# Patient Record
Sex: MALE | Race: BLACK OR AFRICAN AMERICAN | NOT HISPANIC OR LATINO | Employment: UNEMPLOYED | URBAN - METROPOLITAN AREA
[De-identification: names, ages, dates, MRNs, and addresses within clinical notes are randomized per-mention and may not be internally consistent; named-entity substitution may affect disease eponyms.]

---

## 2021-11-30 ENCOUNTER — OFFICE VISIT (OUTPATIENT)
Dept: PSYCHIATRY | Facility: CLINIC | Age: 31
End: 2021-11-30

## 2021-11-30 DIAGNOSIS — F31.60 BIPOLAR AFFECTIVE DISORDER, MIXED (HCC): Chronic | ICD-10-CM

## 2021-11-30 DIAGNOSIS — F41.1 GAD (GENERALIZED ANXIETY DISORDER): Primary | Chronic | ICD-10-CM

## 2021-11-30 DIAGNOSIS — F90.0 ATTENTION DEFICIT HYPERACTIVITY DISORDER (ADHD), PREDOMINANTLY INATTENTIVE TYPE: Chronic | ICD-10-CM

## 2021-11-30 PROCEDURE — 90792 PSYCH DIAG EVAL W/MED SRVCS: CPT | Performed by: PHYSICIAN ASSISTANT

## 2021-11-30 RX ORDER — DEXTROAMPHETAMINE SACCHARATE, AMPHETAMINE ASPARTATE, DEXTROAMPHETAMINE SULFATE AND AMPHETAMINE SULFATE 2.5; 2.5; 2.5; 2.5 MG/1; MG/1; MG/1; MG/1
TABLET ORAL
Qty: 30 TABLET | Refills: 0 | Status: SHIPPED | OUTPATIENT
Start: 2021-11-30 | End: 2021-12-30 | Stop reason: SDUPTHER

## 2021-11-30 RX ORDER — CLONAZEPAM 1 MG/1
1 TABLET ORAL 3 TIMES DAILY PRN
Qty: 90 TABLET | Refills: 2 | Status: SHIPPED | OUTPATIENT
Start: 2021-11-30 | End: 2022-02-22 | Stop reason: SDUPTHER

## 2021-11-30 RX ORDER — DEXTROAMPHETAMINE SACCHARATE, AMPHETAMINE ASPARTATE MONOHYDRATE, DEXTROAMPHETAMINE SULFATE AND AMPHETAMINE SULFATE 5; 5; 5; 5 MG/1; MG/1; MG/1; MG/1
20 CAPSULE, EXTENDED RELEASE ORAL EVERY MORNING
Qty: 30 CAPSULE | Refills: 0 | Status: SHIPPED | OUTPATIENT
Start: 2021-11-30 | End: 2021-12-30 | Stop reason: DRUGHIGH

## 2021-12-02 NOTE — PROGRESS NOTES
I have reviewed the notes, assessments, and/or procedures performed by Thania Reyes , I concur with her/his documentation of Magan Valenzuela.

## 2021-12-08 RX ORDER — FLUOXETINE HYDROCHLORIDE 20 MG/1
20 CAPSULE ORAL DAILY
Qty: 30 CAPSULE | Refills: 0 | Status: SHIPPED | OUTPATIENT
Start: 2021-12-08 | End: 2021-12-28 | Stop reason: SDUPTHER

## 2021-12-08 RX ORDER — LAMOTRIGINE 25 MG/1
25 TABLET ORAL 2 TIMES DAILY
Qty: 60 TABLET | Refills: 0 | Status: SHIPPED | OUTPATIENT
Start: 2021-12-08 | End: 2021-12-28 | Stop reason: SDUPTHER

## 2021-12-08 NOTE — PROGRESS NOTES
I called the patient's mother, Jyotsna, as he requested to discuss his GeneSight results.  Vraylar is use as directed, so he can restart 1.5 mg daily and we can increase it to 3 mg daily if needed at his next follow-up.  The sertraline had a moderate Gene to drug interactions, so instead, we will start Prozac 20 mg daily for anxiety and depression and can increase to 40 mg daily for his next refill.  Start Lamictal 25 mg twice daily for mood stabilizer.  We can increase it to 50 mg twice daily in a few weeks.  I advised him to start the Lamictal first, then start the Prozac 2 weeks later if tolerating the Lamictal.  He is a normal converter of folic acid.

## 2021-12-28 ENCOUNTER — PATIENT MESSAGE (OUTPATIENT)
Dept: PSYCHIATRY | Facility: CLINIC | Age: 31
End: 2021-12-28

## 2021-12-28 DIAGNOSIS — F41.1 GAD (GENERALIZED ANXIETY DISORDER): Chronic | ICD-10-CM

## 2021-12-28 DIAGNOSIS — F90.0 ATTENTION DEFICIT HYPERACTIVITY DISORDER (ADHD), PREDOMINANTLY INATTENTIVE TYPE: Chronic | ICD-10-CM

## 2021-12-28 RX ORDER — DEXTROAMPHETAMINE SACCHARATE, AMPHETAMINE ASPARTATE, DEXTROAMPHETAMINE SULFATE AND AMPHETAMINE SULFATE 2.5; 2.5; 2.5; 2.5 MG/1; MG/1; MG/1; MG/1
TABLET ORAL
Qty: 30 TABLET | Refills: 0 | Status: CANCELLED | OUTPATIENT
Start: 2021-12-28

## 2021-12-28 RX ORDER — DEXTROAMPHETAMINE SACCHARATE, AMPHETAMINE ASPARTATE MONOHYDRATE, DEXTROAMPHETAMINE SULFATE AND AMPHETAMINE SULFATE 5; 5; 5; 5 MG/1; MG/1; MG/1; MG/1
20 CAPSULE, EXTENDED RELEASE ORAL EVERY MORNING
Qty: 30 CAPSULE | Refills: 0 | Status: CANCELLED | OUTPATIENT
Start: 2021-12-28

## 2021-12-30 RX ORDER — DEXTROAMPHETAMINE SACCHARATE, AMPHETAMINE ASPARTATE MONOHYDRATE, DEXTROAMPHETAMINE SULFATE AND AMPHETAMINE SULFATE 7.5; 7.5; 7.5; 7.5 MG/1; MG/1; MG/1; MG/1
30 CAPSULE, EXTENDED RELEASE ORAL EVERY MORNING
Qty: 30 CAPSULE | Refills: 0 | Status: SHIPPED | OUTPATIENT
Start: 2021-12-30 | End: 2022-01-23 | Stop reason: SDUPTHER

## 2021-12-30 RX ORDER — FLUOXETINE HYDROCHLORIDE 20 MG/1
20 CAPSULE ORAL DAILY
Qty: 90 CAPSULE | Refills: 1 | Status: SHIPPED | OUTPATIENT
Start: 2021-12-30 | End: 2022-03-02

## 2021-12-30 RX ORDER — DEXTROAMPHETAMINE SACCHARATE, AMPHETAMINE ASPARTATE, DEXTROAMPHETAMINE SULFATE AND AMPHETAMINE SULFATE 2.5; 2.5; 2.5; 2.5 MG/1; MG/1; MG/1; MG/1
TABLET ORAL
Qty: 30 TABLET | Refills: 0 | Status: SHIPPED | OUTPATIENT
Start: 2021-12-30 | End: 2022-01-23 | Stop reason: SDUPTHER

## 2021-12-30 RX ORDER — LAMOTRIGINE 100 MG/1
50 TABLET ORAL 2 TIMES DAILY
Qty: 60 TABLET | Refills: 2 | Status: SHIPPED | OUTPATIENT
Start: 2021-12-30 | End: 2022-03-02

## 2021-12-30 RX ORDER — CLONAZEPAM 1 MG/1
1 TABLET ORAL 3 TIMES DAILY PRN
Qty: 90 TABLET | Refills: 2 | OUTPATIENT
Start: 2021-12-30 | End: 2022-12-30

## 2022-01-21 RX ORDER — LAMOTRIGINE 25 MG/1
25 TABLET ORAL 2 TIMES DAILY
Qty: 60 TABLET | Refills: 0 | OUTPATIENT
Start: 2022-01-21 | End: 2023-01-21

## 2022-01-21 RX ORDER — FLUOXETINE HYDROCHLORIDE 20 MG/1
20 CAPSULE ORAL DAILY
Qty: 30 CAPSULE | OUTPATIENT
Start: 2022-01-21 | End: 2023-01-21

## 2022-01-23 DIAGNOSIS — F90.0 ATTENTION DEFICIT HYPERACTIVITY DISORDER (ADHD), PREDOMINANTLY INATTENTIVE TYPE: Chronic | ICD-10-CM

## 2022-01-23 RX ORDER — DEXTROAMPHETAMINE SACCHARATE, AMPHETAMINE ASPARTATE MONOHYDRATE, DEXTROAMPHETAMINE SULFATE AND AMPHETAMINE SULFATE 7.5; 7.5; 7.5; 7.5 MG/1; MG/1; MG/1; MG/1
30 CAPSULE, EXTENDED RELEASE ORAL EVERY MORNING
Qty: 30 CAPSULE | Refills: 0 | Status: SHIPPED | OUTPATIENT
Start: 2022-01-23 | End: 2022-02-22 | Stop reason: SDUPTHER

## 2022-01-23 RX ORDER — DEXTROAMPHETAMINE SACCHARATE, AMPHETAMINE ASPARTATE, DEXTROAMPHETAMINE SULFATE AND AMPHETAMINE SULFATE 2.5; 2.5; 2.5; 2.5 MG/1; MG/1; MG/1; MG/1
TABLET ORAL
Qty: 30 TABLET | Refills: 0 | Status: SHIPPED | OUTPATIENT
Start: 2022-01-23 | End: 2022-02-22 | Stop reason: SDUPTHER

## 2022-02-22 ENCOUNTER — TELEPHONE (OUTPATIENT)
Dept: PSYCHIATRY | Facility: CLINIC | Age: 32
End: 2022-02-22

## 2022-02-22 DIAGNOSIS — F41.1 GAD (GENERALIZED ANXIETY DISORDER): Chronic | ICD-10-CM

## 2022-02-22 DIAGNOSIS — F90.0 ATTENTION DEFICIT HYPERACTIVITY DISORDER (ADHD), PREDOMINANTLY INATTENTIVE TYPE: Chronic | ICD-10-CM

## 2022-02-22 RX ORDER — CLONAZEPAM 1 MG/1
1 TABLET ORAL 3 TIMES DAILY PRN
Qty: 90 TABLET | Refills: 2 | Status: SHIPPED | OUTPATIENT
Start: 2022-02-22 | End: 2022-05-24

## 2022-02-22 RX ORDER — DEXTROAMPHETAMINE SACCHARATE, AMPHETAMINE ASPARTATE, DEXTROAMPHETAMINE SULFATE AND AMPHETAMINE SULFATE 2.5; 2.5; 2.5; 2.5 MG/1; MG/1; MG/1; MG/1
TABLET ORAL
Qty: 30 TABLET | Refills: 0 | Status: SHIPPED | OUTPATIENT
Start: 2022-02-22 | End: 2022-03-29 | Stop reason: SDUPTHER

## 2022-02-22 RX ORDER — CLONAZEPAM 1 MG/1
1 TABLET ORAL 3 TIMES DAILY PRN
Qty: 90 TABLET | Refills: 2 | Status: CANCELLED | OUTPATIENT
Start: 2022-02-22 | End: 2023-02-22

## 2022-02-22 RX ORDER — DEXTROAMPHETAMINE SACCHARATE, AMPHETAMINE ASPARTATE MONOHYDRATE, DEXTROAMPHETAMINE SULFATE AND AMPHETAMINE SULFATE 7.5; 7.5; 7.5; 7.5 MG/1; MG/1; MG/1; MG/1
30 CAPSULE, EXTENDED RELEASE ORAL EVERY MORNING
Qty: 30 CAPSULE | Refills: 0 | Status: SHIPPED | OUTPATIENT
Start: 2022-02-22 | End: 2022-03-29 | Stop reason: SDUPTHER

## 2022-02-22 NOTE — TELEPHONE ENCOUNTER
From: Thania Reyes PA-C  To: Magan Valenzuela  Sent: 12/28/2021 9:48 AM EST  Subject: Medication dosages    Magan,     Before I send in refills, I wanted to check on how you are doing with the new medications, fluoxetine and lamotrigine. I saw your note that the Adderall XR needed to be increased to 30 mg, but we had discussed possibly increasing the fluoxetine to 40 mg daily and the lamotrigine to 50 mg twice daily. How are you doing with the new medications? Do you need increases? Let me know and I will take care of sending a new prescriptions for all of the medications.    Thania

## 2022-02-22 NOTE — TELEPHONE ENCOUNTER
Rx Refill Note  Requested Prescriptions     Pending Prescriptions Disp Refills   • amphetamine-dextroamphetamine (Adderall) 10 MG tablet 30 tablet 0     Sig: Take one tablet at 4-pm for breakthrough ADHD symptoms   • amphetamine-dextroamphetamine XR (Adderall XR) 30 MG 24 hr capsule 30 capsule 0     Sig: Take 1 capsule by mouth Every Morning   • clonazePAM (KlonoPIN) 1 MG tablet 90 tablet 2     Sig: Take 1 tablet by mouth 3 (Three) Times a Day As Needed for Anxiety.      Last office visit with prescribing clinician: 11/30/2021      Next office visit with prescribing clinician: 3/2/2022   Office Visit with Thania Reyes PA-C (11/30/2021)       Shayna Marsh, (11/30/2021)      Luz Ashraf MA  02/22/22, 09:52 EST     Pt says he is doing great on all of his medications.  The only medication he doesn't take prozac.  Inspect printed.

## 2022-03-02 ENCOUNTER — TELEMEDICINE (OUTPATIENT)
Dept: PSYCHIATRY | Facility: CLINIC | Age: 32
End: 2022-03-02

## 2022-03-02 DIAGNOSIS — F90.0 ATTENTION DEFICIT HYPERACTIVITY DISORDER (ADHD), PREDOMINANTLY INATTENTIVE TYPE: Primary | Chronic | ICD-10-CM

## 2022-03-02 DIAGNOSIS — F41.1 GAD (GENERALIZED ANXIETY DISORDER): Chronic | ICD-10-CM

## 2022-03-02 DIAGNOSIS — F31.60 BIPOLAR AFFECTIVE DISORDER, MIXED: Chronic | ICD-10-CM

## 2022-03-02 PROCEDURE — 99214 OFFICE O/P EST MOD 30 MIN: CPT | Performed by: PHYSICIAN ASSISTANT

## 2022-03-02 RX ORDER — LAMOTRIGINE 100 MG/1
100 TABLET ORAL 2 TIMES DAILY
Qty: 180 TABLET | Refills: 1 | Status: SHIPPED | OUTPATIENT
Start: 2022-03-02 | End: 2022-09-13 | Stop reason: SDUPTHER

## 2022-03-02 NOTE — PROGRESS NOTES
"Subjective   Magan Valenzuela is a 31 y.o. male who presents today for follow up    This provider is located at [facility], [address] [place] [address] [place],using a secure MyChart Video Visit through [employer]. Patient is being seen remotely via telehealth at their home address in [place] , and stated they are in a secure environment for this session. The patient's condition being diagnosed/treated is appropriate for telemedicine. The provider identified herself as well as her credentials. The patient, and/or patients guardian, consent to be seen remotely, and when consent is given they understand that the consent allows for patient identifiable information to be sent to a third party as needed. They may refuse to be seen remotely at any time. The electronic data is encrypted and password protected, and the patient and/or guardian has been advised of the potential risks to privacy not withstanding such measures.     Chief Complaint:  Depression, anxiety, ADHD     History of Present Illness:   Patient reports doing much better with meds.  He did stop the Prozac after 2 weeks, thought it was making him irritable.  He is still taking Vraylar, Adderall and Lamictal and Klonopin.  Lamotrigine has been very helpful, but has days when his mom tells him to take a Klonopin because he is edgy and \"sharp tongue\", advised him we should increase the Lamictal instead of taking more Klonopin.  Energy and motivation are better  He has 3 kids, 1 yr, 2 yr and a 15 yr (adopted)  His ex has the 1 yr old and makes it difficult to see her.   He was seeing Lesa Dee NP at [facility] but then involved in a CPS case with one of his kids and they had him seeing Olimpia Espitia at [facility], who cut off all prescriptions and discharged him when urine drug screen negative for Klonopin  [facility], saw therapist (Alex) twice a week  Dx with ADHD in the second grade and was started on Adderall per Mom  Started on " Xanax in his late teens when living in [place] but did not like it all, dx with Bipolar disorder  No relationship with his father  Depression 5/10  Denies SI/HI  Anxiety 5/10,    More attentive and able to complete tasks now that he is back on Adderall.      The following portions of the patient's history were reviewed and updated as appropriate: allergies, current medications, past family history, past medical history, past social history, past surgical history and problem list.    PAST PSYCHIATRIC HISTORY  Axis I  Affective/Bipoloar Disorder, Anxiety/Panic Disorder, Attention Deficit Disorder  Axis II  None    PAST OUTPATIENT TREATMENT  Diagnosis treated:  Affective Disorder, Anxiety/Panic Disorder, ADD  Treatment Type:  Individual Therapy, Medication Management  Genesight testing done December 2021, normal converter of folic acid  Prior Psychiatric Medications:  Xanax  Adderall   Klonopin  Vraylar, helpful  Sertraline  Lamictal  Prozac  Support Groups:  None  Sequelae Of Mental Disorder:  job disruption, social isolation, family disruption, emotional distress        Interval History  Improved    Side Effects  None    Past psychiatric history was reviewed and compared to 11/30/2021 visit and appropriate updates were made.    Past Medical History:  Past Medical History:   Diagnosis Date   • ADHD (attention deficit hyperactivity disorder)    • Anxiety    • Bipolar disorder (HCC)        Social History:  Social History     Socioeconomic History   • Marital status: Single   Tobacco Use   • Smoking status: Current Some Day Smoker   • Smokeless tobacco: Never Used   Substance and Sexual Activity   • Alcohol use: Not Currently   • Drug use: Yes     Types: Marijuana   • Sexual activity: Defer       Family History:  Family History   Problem Relation Age of Onset   • Depression Mother    • Anxiety disorder Mother        Past Surgical History:  History reviewed. No pertinent surgical history.    Problem List:  Patient Active  Problem List   Diagnosis   • ADHD (attention deficit hyperactivity disorder)   • FLOR (generalized anxiety disorder)   • Bipolar affective disorder, mixed (HCC)       Allergy:   Allergies   Allergen Reactions   • Penicillins Anaphylaxis   • Rocephin [Ceftriaxone] Anaphylaxis        Discontinued Medications:  Medications Discontinued During This Encounter   Medication Reason   • FLUoxetine (PROzac) 20 MG capsule *Therapy completed   • lamoTRIgine (LaMICtal) 100 MG tablet        Current Medications:   Current Outpatient Medications   Medication Sig Dispense Refill   • amphetamine-dextroamphetamine (Adderall) 10 MG tablet Take one tablet at 4-pm for breakthrough ADHD symptoms 30 tablet 0   • amphetamine-dextroamphetamine XR (Adderall XR) 30 MG 24 hr capsule Take 1 capsule by mouth Every Morning 30 capsule 0   • Cariprazine HCl (Vraylar) 1.5 MG capsule capsule Take 1 capsule by mouth Daily. 30 capsule 3   • clonazePAM (KlonoPIN) 1 MG tablet Take 1 tablet by mouth 3 (Three) Times a Day As Needed for Anxiety. 90 tablet 2   • lamoTRIgine (LaMICtal) 100 MG tablet Take 1 tablet by mouth 2 (Two) Times a Day. For mood stabilizer 180 tablet 1     No current facility-administered medications for this visit.         Psychological ROS: positive for - anxiety, concentration difficulties, depression, irritability, memory difficulties, mood swings and sleep disturbances  negative for - behavioral disorder, decreased libido, disorientation, hallucinations, hostility, obsessive thoughts, physical abuse, sexual abuse or suicidal ideation      Physical Exam:   There were no vitals taken for this visit.    Mental Status Exam:   Hygiene:   good  Cooperation:  Cooperative  Eye Contact:  Good  Psychomotor Behavior:  Appropriate  Affect: Appropriate  Mood: Normal  Hopelessness: Denies  Speech:  Normal  Thought Process:  Goal directed  Thought Content:  Normal  Suicidal:  None  Homicidal:  None  Hallucinations:  None  Delusion:  None  Memory:   Intact  Orientation:  Person, Place, Time and Situation  Reliability:  good  Insight:  Good  Judgement:  Good  Impulse Control:  Good  Physical/Medical Issues:  No      Mental status exam was reviewed and compared to 11/30/2021 visit and appropriate updates were made.    PHQ-9 Depression Screening  Little interest or pleasure in doing things? 1   Feeling down, depressed, or hopeless? 2   Trouble falling or staying asleep, or sleeping too much? 0   Feeling tired or having little energy? 1   Poor appetite or overeating? 0   Feeling bad about yourself - or that you are a failure or have let yourself or your family down? 2   Trouble concentrating on things, such as reading the newspaper or watching television? 1   Moving or speaking so slowly that other people could have noticed? Or the opposite - being so fidgety or restless that you have been moving around a lot more than usual? 0   Thoughts that you would be better off dead, or of hurting yourself in some way? 0   PHQ-9 Total Score 7   If you checked off any problems, how difficult have these problems made it for you to do your work, take care of things at home, or get along with other people? Somewhat difficult           Current some day smoker less than 3 minutes spent counseling Has reduced Tobbacos use    I advised Magan of the risks of tobacco use.     Lab Results:   No visits with results within 3 Month(s) from this visit.   Latest known visit with results is:   No results found for any previous visit.       Assessment/Plan   Problems Addressed this Visit        Mental Health    ADHD (attention deficit hyperactivity disorder) - Primary (Chronic)    FLOR (generalized anxiety disorder) (Chronic)    Bipolar affective disorder, mixed (HCC) (Chronic)      Diagnoses       Codes Comments    Attention deficit hyperactivity disorder (ADHD), predominantly inattentive type    -  Primary ICD-10-CM: F90.0  ICD-9-CM: 314.00     Bipolar affective disorder, mixed (HCC)      ICD-10-CM: F31.60  ICD-9-CM: 296.60     FLOR (generalized anxiety disorder)     ICD-10-CM: F41.1  ICD-9-CM: 300.02           Visit Diagnoses:    ICD-10-CM ICD-9-CM   1. Attention deficit hyperactivity disorder (ADHD), predominantly inattentive type  F90.0 314.00   2. Bipolar affective disorder, mixed (HCC)  F31.60 296.60   3. FLOR (generalized anxiety disorder)  F41.1 300.02       TREATMENT PLAN/GOALS: Continue supportive psychotherapy efforts and medications as indicated. Treatment and medication options discussed during today's visit. Patient ackowledged and verbally consented to continue with current treatment plan and was educated on the importance of compliance with treatment and follow-up appointments.    MEDICATION ISSUES:  INSPECT reviewed as expected  Discussed medication options and treatment plan of prescribed medication as well as the risks, benefits, and side effects including potential falls, possible impaired driving and metabolic adversities among others. Patient is agreeable to call the office with any worsening of symptoms or onset of side effects. Patient is agreeable to call 911 or go to the nearest ER should he/she begin having SI/HI. No medication side effects or related complaints today.     Patient with a long history of ADHD, bipolar disorder and anxiety.  Patient reports doing much better on his current medications.  Continue Vraylar 1.5 mg daily for bipolar, use as directed per [email or web address].  He discontinued the Prozac, but his depression has significantly improved since last visit, so we will hold on adding an antidepressant for now.  Continue Adderall XR 30 mg capsule every morning for ADD, +10 mg IR tablet in the afternoon as needed  Continue Klonopin 1 mg tablet 3 times daily as needed for anxiety, encouraged him to start decreasing the frequency of dosing.  Increase Lamictal to 100 mg twice daily for mood stabilizer.  Continue counseling.    MEDS ORDERED DURING VISIT:  New Medications  Ordered This Visit   Medications   • lamoTRIgine (LaMICtal) 100 MG tablet     Sig: Take 1 tablet by mouth 2 (Two) Times a Day. For mood stabilizer     Dispense:  180 tablet     Refill:  1       Return in about 3 months (around 6/2/2022).         This document has been electronically signed by Thania Reyes PA-C  March 2, 2022 13:45 EST    Part of this note may be an electronic transcription/translation of spoken language to printed text using the Dragon Dictation System.

## 2022-03-23 DIAGNOSIS — F90.0 ATTENTION DEFICIT HYPERACTIVITY DISORDER (ADHD), PREDOMINANTLY INATTENTIVE TYPE: Chronic | ICD-10-CM

## 2022-03-23 RX ORDER — DEXTROAMPHETAMINE SACCHARATE, AMPHETAMINE ASPARTATE, DEXTROAMPHETAMINE SULFATE AND AMPHETAMINE SULFATE 2.5; 2.5; 2.5; 2.5 MG/1; MG/1; MG/1; MG/1
TABLET ORAL
Qty: 30 TABLET | Refills: 0 | Status: CANCELLED | OUTPATIENT
Start: 2022-03-23

## 2022-03-23 RX ORDER — DEXTROAMPHETAMINE SACCHARATE, AMPHETAMINE ASPARTATE MONOHYDRATE, DEXTROAMPHETAMINE SULFATE AND AMPHETAMINE SULFATE 7.5; 7.5; 7.5; 7.5 MG/1; MG/1; MG/1; MG/1
30 CAPSULE, EXTENDED RELEASE ORAL EVERY MORNING
Qty: 30 CAPSULE | Refills: 0 | Status: CANCELLED | OUTPATIENT
Start: 2022-03-23

## 2022-03-23 NOTE — TELEPHONE ENCOUNTER
Patient called for refill of Adderall. Patient had telehealth visit on 03/02/2022 with last refill in feb 2022

## 2022-03-29 ENCOUNTER — TELEPHONE (OUTPATIENT)
Dept: PSYCHIATRY | Facility: CLINIC | Age: 32
End: 2022-03-29

## 2022-03-29 DIAGNOSIS — F90.0 ATTENTION DEFICIT HYPERACTIVITY DISORDER (ADHD), PREDOMINANTLY INATTENTIVE TYPE: Chronic | ICD-10-CM

## 2022-03-29 RX ORDER — DEXTROAMPHETAMINE SACCHARATE, AMPHETAMINE ASPARTATE MONOHYDRATE, DEXTROAMPHETAMINE SULFATE AND AMPHETAMINE SULFATE 7.5; 7.5; 7.5; 7.5 MG/1; MG/1; MG/1; MG/1
30 CAPSULE, EXTENDED RELEASE ORAL EVERY MORNING
Qty: 30 CAPSULE | Refills: 0 | Status: SHIPPED | OUTPATIENT
Start: 2022-03-29 | End: 2022-04-27 | Stop reason: SDUPTHER

## 2022-03-29 RX ORDER — DEXTROAMPHETAMINE SACCHARATE, AMPHETAMINE ASPARTATE, DEXTROAMPHETAMINE SULFATE AND AMPHETAMINE SULFATE 2.5; 2.5; 2.5; 2.5 MG/1; MG/1; MG/1; MG/1
TABLET ORAL
Qty: 30 TABLET | Refills: 0 | Status: SHIPPED | OUTPATIENT
Start: 2022-03-29 | End: 2022-04-27 | Stop reason: SDUPTHER

## 2022-03-29 NOTE — TELEPHONE ENCOUNTER
Patient was letting us know he is due a refill for the following meds   amphetamine-dextroamphetamine (Adderall) 10 MG tablet     patient see provider Thania Reyes PA-C   Pharmacy is correct In chart as wel for where this med goes     Patients phone number is correct in chart for call back as well

## 2022-04-27 DIAGNOSIS — F90.0 ATTENTION DEFICIT HYPERACTIVITY DISORDER (ADHD), PREDOMINANTLY INATTENTIVE TYPE: Chronic | ICD-10-CM

## 2022-04-27 RX ORDER — DEXTROAMPHETAMINE SACCHARATE, AMPHETAMINE ASPARTATE MONOHYDRATE, DEXTROAMPHETAMINE SULFATE AND AMPHETAMINE SULFATE 7.5; 7.5; 7.5; 7.5 MG/1; MG/1; MG/1; MG/1
30 CAPSULE, EXTENDED RELEASE ORAL EVERY MORNING
Qty: 30 CAPSULE | Refills: 0 | Status: SHIPPED | OUTPATIENT
Start: 2022-04-27 | End: 2022-05-23 | Stop reason: SDUPTHER

## 2022-04-27 RX ORDER — DEXTROAMPHETAMINE SACCHARATE, AMPHETAMINE ASPARTATE, DEXTROAMPHETAMINE SULFATE AND AMPHETAMINE SULFATE 2.5; 2.5; 2.5; 2.5 MG/1; MG/1; MG/1; MG/1
TABLET ORAL
Qty: 30 TABLET | Refills: 0 | Status: SHIPPED | OUTPATIENT
Start: 2022-04-27 | End: 2022-05-23 | Stop reason: SDUPTHER

## 2022-04-27 NOTE — TELEPHONE ENCOUNTER
Rx Refill Note  Requested Prescriptions     Pending Prescriptions Disp Refills   • amphetamine-dextroamphetamine XR (Adderall XR) 30 MG 24 hr capsule 30 capsule 0     Sig: Take 1 capsule by mouth Every Morning   • amphetamine-dextroamphetamine (Adderall) 10 MG tablet 30 tablet 0     Sig: Take one tablet at 4-pm for breakthrough ADHD symptoms      Last office visit with prescribing clinician: 11/30/2021      Next office visit with prescribing clinician: Visit date not found   Telemedicine with Thania Reyes PA-C (03/02/2022)      {TIP  Please add Last Relevant Lab Date if appropriate:23} Shayna - Salma, (11/30/2021)      Luz Ashraf MA  04/27/22, 12:04 EDT     Inspect printed

## 2022-05-19 RX ORDER — CARIPRAZINE 1.5 MG/1
CAPSULE, GELATIN COATED ORAL
Qty: 30 CAPSULE | Refills: 3 | Status: SHIPPED | OUTPATIENT
Start: 2022-05-19 | End: 2022-09-13 | Stop reason: SDUPTHER

## 2022-05-21 DIAGNOSIS — F41.1 GAD (GENERALIZED ANXIETY DISORDER): Chronic | ICD-10-CM

## 2022-05-23 ENCOUNTER — TELEPHONE (OUTPATIENT)
Dept: CARDIOLOGY | Facility: CLINIC | Age: 32
End: 2022-05-23

## 2022-05-23 DIAGNOSIS — F90.0 ATTENTION DEFICIT HYPERACTIVITY DISORDER (ADHD), PREDOMINANTLY INATTENTIVE TYPE: Chronic | ICD-10-CM

## 2022-05-23 RX ORDER — DEXTROAMPHETAMINE SACCHARATE, AMPHETAMINE ASPARTATE, DEXTROAMPHETAMINE SULFATE AND AMPHETAMINE SULFATE 2.5; 2.5; 2.5; 2.5 MG/1; MG/1; MG/1; MG/1
TABLET ORAL
Qty: 30 TABLET | Refills: 0 | Status: SHIPPED | OUTPATIENT
Start: 2022-05-23 | End: 2022-06-24 | Stop reason: SDUPTHER

## 2022-05-23 RX ORDER — DEXTROAMPHETAMINE SACCHARATE, AMPHETAMINE ASPARTATE MONOHYDRATE, DEXTROAMPHETAMINE SULFATE AND AMPHETAMINE SULFATE 7.5; 7.5; 7.5; 7.5 MG/1; MG/1; MG/1; MG/1
30 CAPSULE, EXTENDED RELEASE ORAL EVERY MORNING
Qty: 30 CAPSULE | Refills: 0 | Status: SHIPPED | OUTPATIENT
Start: 2022-05-23 | End: 2022-06-24 | Stop reason: SDUPTHER

## 2022-05-24 RX ORDER — CLONAZEPAM 1 MG/1
1 TABLET ORAL 3 TIMES DAILY PRN
Qty: 90 TABLET | Refills: 2 | Status: SHIPPED | OUTPATIENT
Start: 2022-05-24 | End: 2022-08-24

## 2022-06-24 DIAGNOSIS — F90.0 ATTENTION DEFICIT HYPERACTIVITY DISORDER (ADHD), PREDOMINANTLY INATTENTIVE TYPE: Chronic | ICD-10-CM

## 2022-06-24 NOTE — TELEPHONE ENCOUNTER
Mother called and pt needs both Adderalls 10 mg and 30 mg    Verified pharm is [facility] [place]

## 2022-06-27 RX ORDER — DEXTROAMPHETAMINE SACCHARATE, AMPHETAMINE ASPARTATE, DEXTROAMPHETAMINE SULFATE AND AMPHETAMINE SULFATE 2.5; 2.5; 2.5; 2.5 MG/1; MG/1; MG/1; MG/1
TABLET ORAL
Qty: 30 TABLET | Refills: 0 | Status: SHIPPED | OUTPATIENT
Start: 2022-06-27 | End: 2022-07-28 | Stop reason: SDUPTHER

## 2022-06-27 RX ORDER — DEXTROAMPHETAMINE SACCHARATE, AMPHETAMINE ASPARTATE MONOHYDRATE, DEXTROAMPHETAMINE SULFATE AND AMPHETAMINE SULFATE 7.5; 7.5; 7.5; 7.5 MG/1; MG/1; MG/1; MG/1
30 CAPSULE, EXTENDED RELEASE ORAL EVERY MORNING
Qty: 30 CAPSULE | Refills: 0 | Status: SHIPPED | OUTPATIENT
Start: 2022-06-27 | End: 2022-07-28 | Stop reason: SDUPTHER

## 2022-07-27 DIAGNOSIS — F90.0 ATTENTION DEFICIT HYPERACTIVITY DISORDER (ADHD), PREDOMINANTLY INATTENTIVE TYPE: Chronic | ICD-10-CM

## 2022-07-28 ENCOUNTER — TELEMEDICINE (OUTPATIENT)
Dept: PSYCHIATRY | Facility: CLINIC | Age: 32
End: 2022-07-28

## 2022-07-28 DIAGNOSIS — F31.60 BIPOLAR AFFECTIVE DISORDER, MIXED: Primary | Chronic | ICD-10-CM

## 2022-07-28 DIAGNOSIS — F90.0 ATTENTION DEFICIT HYPERACTIVITY DISORDER (ADHD), PREDOMINANTLY INATTENTIVE TYPE: Chronic | ICD-10-CM

## 2022-07-28 DIAGNOSIS — F41.1 GAD (GENERALIZED ANXIETY DISORDER): Chronic | ICD-10-CM

## 2022-07-28 PROCEDURE — 99214 OFFICE O/P EST MOD 30 MIN: CPT | Performed by: PHYSICIAN ASSISTANT

## 2022-07-28 RX ORDER — DEXTROAMPHETAMINE SACCHARATE, AMPHETAMINE ASPARTATE MONOHYDRATE, DEXTROAMPHETAMINE SULFATE AND AMPHETAMINE SULFATE 7.5; 7.5; 7.5; 7.5 MG/1; MG/1; MG/1; MG/1
30 CAPSULE, EXTENDED RELEASE ORAL EVERY MORNING
Qty: 30 CAPSULE | Refills: 0 | OUTPATIENT
Start: 2022-07-28

## 2022-07-28 RX ORDER — DEXTROAMPHETAMINE SACCHARATE, AMPHETAMINE ASPARTATE, DEXTROAMPHETAMINE SULFATE AND AMPHETAMINE SULFATE 2.5; 2.5; 2.5; 2.5 MG/1; MG/1; MG/1; MG/1
TABLET ORAL
Qty: 30 TABLET | Refills: 0 | Status: SHIPPED | OUTPATIENT
Start: 2022-07-28 | End: 2022-08-30 | Stop reason: SDUPTHER

## 2022-07-28 RX ORDER — DEXTROAMPHETAMINE SACCHARATE, AMPHETAMINE ASPARTATE MONOHYDRATE, DEXTROAMPHETAMINE SULFATE AND AMPHETAMINE SULFATE 7.5; 7.5; 7.5; 7.5 MG/1; MG/1; MG/1; MG/1
30 CAPSULE, EXTENDED RELEASE ORAL EVERY MORNING
Qty: 30 CAPSULE | Refills: 0 | Status: SHIPPED | OUTPATIENT
Start: 2022-07-28 | End: 2022-08-30 | Stop reason: SDUPTHER

## 2022-07-28 RX ORDER — DEXTROAMPHETAMINE SACCHARATE, AMPHETAMINE ASPARTATE, DEXTROAMPHETAMINE SULFATE AND AMPHETAMINE SULFATE 2.5; 2.5; 2.5; 2.5 MG/1; MG/1; MG/1; MG/1
TABLET ORAL
Qty: 30 TABLET | Refills: 0 | OUTPATIENT
Start: 2022-07-28

## 2022-07-28 NOTE — PROGRESS NOTES
"Subjective   Magan Valenzuela is a 31 y.o. male who presents today for follow up    This provider is located at [facility], [address] [place] [address], [facility], [id number] using a secure [employer] Video Visit through [employer]. Patient is being seen remotely via telehealth at their home address in [place], and stated they are in a secure environment for this session. The patient's condition being diagnosed/treated is appropriate for telemedicine. The provider identified herself as well as her credentials.   The patient, and/or patients guardian, consent to be seen remotely, and when consent is given they understand that the consent allows for patient identifiable information to be sent to a third party as needed.   They may refuse to be seen remotely at any time. The electronic data is encrypted and password protected, and the patient and/or guardian has been advised of the potential risks to privacy not withstanding such measures.   PT Identifiers used: Name and .    You have chosen to receive care through a telehealth visit.  Do you consent to use a video/audio connection for your medical care today? Yes    Chief Complaint:  Depression, anxiety, ADHD     History of Present Illness:   Patient reports having a rough month, lost his apartment and   Job, has some new prospects for work.  Staying in a hotel with his mom and kids right now, so anxiety is up  Lamotrigine has been very helpful, but has days when his mom tells him to take a Klonopin because he is edgy and \"sharp tongue\", advised him we should increase the Lamictal instead of taking more Klonopin.  He has 3 kids, 1 yr, 2 yr and a 15 yr (adopted)  His ex has the 1 yr old and makes it difficult to see her.   He was seeing Lesa Dee NP at [facility] but then involved in a CPS case with one of his kids and they had him seeing Olimpia Espitia at [facility], who cut off all prescriptions and discharged him when urine drug screen negative for Klonopin  [facility], saw therapist " (Alex) twice a week  Dx with ADHD in the second grade and was started on Adderall per Mom  Started on Xanax in his late teens when living in [place] but did not like it all, dx with Bipolar disorder  No relationship with his father  Depression 4/10  Denies SI/HI  Anxiety 7/10,    More attentive and able to complete tasks now that he is back on Adderall.      The following portions of the patient's history were reviewed and updated as appropriate: allergies, current medications, past family history, past medical history, past social history, past surgical history and problem list.    PAST PSYCHIATRIC HISTORY  Axis I  Affective/Bipoloar Disorder, Anxiety/Panic Disorder, Attention Deficit Disorder  Axis II  None    PAST OUTPATIENT TREATMENT  Diagnosis treated:  Affective Disorder, Anxiety/Panic Disorder, ADD  Treatment Type:  Individual Therapy, Medication Management  Genesight testing done December 2021, normal converter of folic acid  Prior Psychiatric Medications:  Xanax  Adderall   Klonopin  Vraylar, helpful  Sertraline  Lamictal  Prozac  Support Groups:  None  Sequelae Of Mental Disorder:  job disruption, social isolation, family disruption, emotional distress      Interval History  No change    Side Effects  None    Past psychiatric history was reviewed and compared to 3/2/22 visit and appropriate updates were made.    Past Medical History:  Past Medical History:   Diagnosis Date   • ADHD (attention deficit hyperactivity disorder)    • Anxiety    • Bipolar disorder (HCC)        Social History:  Social History     Socioeconomic History   • Marital status: Single   Tobacco Use   • Smoking status: Current Some Day Smoker   • Smokeless tobacco: Never Used   Substance and Sexual Activity   • Alcohol use: Not Currently   • Drug use: Yes     Types: Marijuana   • Sexual activity: Defer       Family History:  Family History   Problem Relation Age of Onset   • Depression Mother    • Anxiety disorder Mother        Past  Surgical History:  History reviewed. No pertinent surgical history.    Problem List:  Patient Active Problem List   Diagnosis   • ADHD (attention deficit hyperactivity disorder)   • FLOR (generalized anxiety disorder)   • Bipolar affective disorder, mixed (HCC)       Allergy:   Allergies   Allergen Reactions   • Penicillins Anaphylaxis   • Rocephin [Ceftriaxone] Anaphylaxis        Discontinued Medications:  Medications Discontinued During This Encounter   Medication Reason   • amphetamine-dextroamphetamine (Adderall) 10 MG tablet Reorder   • amphetamine-dextroamphetamine XR (Adderall XR) 30 MG 24 hr capsule Reorder       Current Medications:   Current Outpatient Medications   Medication Sig Dispense Refill   • amphetamine-dextroamphetamine (Adderall) 10 MG tablet Take one tablet at 4-pm for breakthrough ADHD symptoms 30 tablet 0   • amphetamine-dextroamphetamine XR (Adderall XR) 30 MG 24 hr capsule Take 1 capsule by mouth Every Morning 30 capsule 0   • clonazePAM (KlonoPIN) 1 MG tablet Take 1 tablet by mouth 3 (Three) Times a Day As Needed for Anxiety. 90 tablet 2   • lamoTRIgine (LaMICtal) 100 MG tablet Take 1 tablet by mouth 2 (Two) Times a Day. For mood stabilizer 180 tablet 1   • Vraylar 1.5 MG capsule capsule TAKE 1 CAPSULE BY MOUTH EVERY DAY 30 capsule 3     No current facility-administered medications for this visit.         Psychological ROS: positive for - anxiety, concentration difficulties, depression, irritability, memory difficulties, mood swings and sleep disturbances  negative for - behavioral disorder, decreased libido, disorientation, hallucinations, hostility, obsessive thoughts, physical abuse, sexual abuse or suicidal ideation      Physical Exam:   There were no vitals taken for this visit.    Mental Status Exam:   Hygiene:   good  Cooperation:  Cooperative  Eye Contact:  Good  Psychomotor Behavior:  Appropriate  Affect: Appropriate  Mood: Normal  Hopelessness: Denies  Speech:  Normal  Thought  Process:  Goal directed  Thought Content:  Normal  Suicidal:  None  Homicidal:  None  Hallucinations:  None  Delusion:  None  Memory:  Intact  Orientation:  Person, Place, Time and Situation  Reliability:  good  Insight:  Good  Judgement:  Good  Impulse Control:  Good  Physical/Medical Issues:  No      Mental status exam was reviewed and compared to 3/2/22  visit and appropriate updates were made.    PHQ-9 Depression Screening  Little interest or pleasure in doing things? (P) 2   Feeling down, depressed, or hopeless? (P) 2   Trouble falling or staying asleep, or sleeping too much? (P) 0   Feeling tired or having little energy? (P) 1   Poor appetite or overeating? (P) 2   Feeling bad about yourself - or that you are a failure or have let yourself or your family down? (P) 2   Trouble concentrating on things, such as reading the newspaper or watching television? (P) 0   Moving or speaking so slowly that other people could have noticed? Or the opposite - being so fidgety or restless that you have been moving around a lot more than usual? (P) 0   Thoughts that you would be better off dead, or of hurting yourself in some way? (P) 0   PHQ-9 Total Score (P) 9   If you checked off any problems, how difficult have these problems made it for you to do your work, take care of things at home, or get along with other people? (P) Somewhat difficult           Current some day smoker less than 3 minutes spent counseling Has reduced Tobbacos use    I advised Magan of the risks of tobacco use.     Lab Results:   No visits with results within 3 Month(s) from this visit.   Latest known visit with results is:   No results found for any previous visit.       Assessment & Plan   Problems Addressed this Visit        Mental Health    ADHD (attention deficit hyperactivity disorder) (Chronic)    Relevant Medications    amphetamine-dextroamphetamine (Adderall) 10 MG tablet    amphetamine-dextroamphetamine XR (Adderall XR) 30 MG 24 hr capsule     FLOR (generalized anxiety disorder) (Chronic)    Relevant Medications    amphetamine-dextroamphetamine (Adderall) 10 MG tablet    amphetamine-dextroamphetamine XR (Adderall XR) 30 MG 24 hr capsule    Bipolar affective disorder, mixed (HCC) - Primary (Chronic)    Relevant Medications    amphetamine-dextroamphetamine (Adderall) 10 MG tablet    amphetamine-dextroamphetamine XR (Adderall XR) 30 MG 24 hr capsule      Diagnoses       Codes Comments    Bipolar affective disorder, mixed (HCC)    -  Primary ICD-10-CM: F31.60  ICD-9-CM: 296.60     FLOR (generalized anxiety disorder)     ICD-10-CM: F41.1  ICD-9-CM: 300.02     Attention deficit hyperactivity disorder (ADHD), predominantly inattentive type     ICD-10-CM: F90.0  ICD-9-CM: 314.00           Visit Diagnoses:    ICD-10-CM ICD-9-CM   1. Bipolar affective disorder, mixed (HCC)  F31.60 296.60   2. FLOR (generalized anxiety disorder)  F41.1 300.02   3. Attention deficit hyperactivity disorder (ADHD), predominantly inattentive type  F90.0 314.00       TREATMENT PLAN/GOALS: Continue supportive psychotherapy efforts and medications as indicated. Treatment and medication options discussed during today's visit. Patient ackowledged and verbally consented to continue with current treatment plan and was educated on the importance of compliance with treatment and follow-up appointments.    MEDICATION ISSUES:  INSPECT reviewed as expected  Discussed medication options and treatment plan of prescribed medication as well as the risks, benefits, and side effects including potential falls, possible impaired driving and metabolic adversities among others. Patient is agreeable to call the office with any worsening of symptoms or onset of side effects. Patient is agreeable to call 911 or go to the nearest ER should he/she begin having SI/HI. No medication side effects or related complaints today.     Patient with a long history of ADHD, bipolar disorder and anxiety.  Patient reports  doing much better on his current medications.  Continue Vraylar 1.5 mg daily for bipolar, use as directed per [facility].  Continue Adderall XR 30 mg capsule every morning for ADD, +10 mg IR tablet in the afternoon as needed, last filled 6/27/22 per [facility]  Continue Klonopin 1 mg tablet 3 times daily as needed for anxiety, encouraged him to start decreasing the frequency of dosing.  Continue Lamictal 100 mg twice daily for mood stabilizer.  Continue counseling  Urine drug screen at next visit    MEDS ORDERED DURING VISIT:  New Medications Ordered This Visit   Medications   • amphetamine-dextroamphetamine (Adderall) 10 MG tablet     Sig: Take one tablet at 4-pm for breakthrough ADHD symptoms     Dispense:  30 tablet     Refill:  0   • amphetamine-dextroamphetamine XR (Adderall XR) 30 MG 24 hr capsule     Sig: Take 1 capsule by mouth Every Morning     Dispense:  30 capsule     Refill:  0       Return in about 6 weeks (around 9/8/2022) for video visit.        This document has been electronically signed by Thania Reyes PA-C  July 28, 2022 16:08 EDT    Part of this note may be an electronic transcription/translation of spoken language to printed text using the Dragon Dictation System.

## 2022-08-23 DIAGNOSIS — F41.1 GAD (GENERALIZED ANXIETY DISORDER): Chronic | ICD-10-CM

## 2022-08-24 RX ORDER — CLONAZEPAM 1 MG/1
TABLET ORAL
Qty: 90 TABLET | Refills: 2 | Status: SHIPPED | OUTPATIENT
Start: 2022-08-24 | End: 2022-10-27 | Stop reason: SDUPTHER

## 2022-08-30 DIAGNOSIS — F90.0 ATTENTION DEFICIT HYPERACTIVITY DISORDER (ADHD), PREDOMINANTLY INATTENTIVE TYPE: Chronic | ICD-10-CM

## 2022-08-31 RX ORDER — DEXTROAMPHETAMINE SACCHARATE, AMPHETAMINE ASPARTATE MONOHYDRATE, DEXTROAMPHETAMINE SULFATE AND AMPHETAMINE SULFATE 7.5; 7.5; 7.5; 7.5 MG/1; MG/1; MG/1; MG/1
30 CAPSULE, EXTENDED RELEASE ORAL EVERY MORNING
Qty: 30 CAPSULE | Refills: 0 | Status: SHIPPED | OUTPATIENT
Start: 2022-08-31 | End: 2022-09-26 | Stop reason: SDUPTHER

## 2022-08-31 RX ORDER — DEXTROAMPHETAMINE SACCHARATE, AMPHETAMINE ASPARTATE, DEXTROAMPHETAMINE SULFATE AND AMPHETAMINE SULFATE 2.5; 2.5; 2.5; 2.5 MG/1; MG/1; MG/1; MG/1
TABLET ORAL
Qty: 30 TABLET | Refills: 0 | Status: SHIPPED | OUTPATIENT
Start: 2022-08-31 | End: 2022-09-26 | Stop reason: SDUPTHER

## 2022-09-13 ENCOUNTER — TELEMEDICINE (OUTPATIENT)
Dept: PSYCHIATRY | Facility: CLINIC | Age: 32
End: 2022-09-13

## 2022-09-13 DIAGNOSIS — F41.1 GAD (GENERALIZED ANXIETY DISORDER): Chronic | ICD-10-CM

## 2022-09-13 DIAGNOSIS — F90.0 ATTENTION DEFICIT HYPERACTIVITY DISORDER (ADHD), PREDOMINANTLY INATTENTIVE TYPE: Chronic | ICD-10-CM

## 2022-09-13 DIAGNOSIS — F31.60 BIPOLAR AFFECTIVE DISORDER, MIXED: Primary | Chronic | ICD-10-CM

## 2022-09-13 PROCEDURE — 99214 OFFICE O/P EST MOD 30 MIN: CPT | Performed by: PHYSICIAN ASSISTANT

## 2022-09-13 RX ORDER — LAMOTRIGINE 100 MG/1
100 TABLET ORAL 2 TIMES DAILY
Qty: 180 TABLET | Refills: 1 | Status: SHIPPED | OUTPATIENT
Start: 2022-09-13 | End: 2023-03-20

## 2022-09-13 NOTE — PROGRESS NOTES
Subjective   Magan Valenzuela is a 31 y.o. male who presents today for follow up    This provider is located at [facility], [address] [place] [address], [facility], [address] using a secure [employer] Video Visit through [employer]. Patient is being seen remotely via telehealth at their home address in [place], and stated they are in a secure environment for this session. The patient's condition being diagnosed/treated is appropriate for telemedicine. The provider identified herself as well as her credentials.   The patient, and/or patients guardian, consent to be seen remotely, and when consent is given they understand that the consent allows for patient identifiable information to be sent to a third party as needed.   They may refuse to be seen remotely at any time. The electronic data is encrypted and password protected, and the patient and/or guardian has been advised of the potential risks to privacy not withstanding such measures.   PT Identifiers used: Name and .    You have chosen to receive care through a telehealth visit.  Do you consent to use a video/audio connection for your medical care today? Yes    Chief Complaint:  Depression, anxiety, ADHD     History of Present Illness:   Patient reports having a rough month, lost his apartment and   Job, has some new prospects for work.  Movement in  Two  apartment in [place] [address] [facility]  He has 3 kids, 1 yr, 2 yr and a 15 yr (adopted)  His ex has the 1 yr old and makes it difficult to see her.   He was seeing Lesa Dee NP at [facility] but then involved in a CPS case with one of his kids and they had him seeing Olimpia Espitia at [facility], who cut off all prescriptions and discharged him when urine drug screen negative for Klonopin  [facility], saw therapist (Alex) twice a week  Dx with ADHD in the second grade and was started on Adderall per Mom  Started on Xanax in his late teens when living in [place] but did not like it all, dx with Bipolar disorder  No  "relationship with his father  Depression 4/10, \"coming out of my funk\"  Denies SI/HI  Anxiety 3/10,  Over thinks things  More attentive and able to complete tasks now that he is back on Adderall.      The following portions of the patient's history were reviewed and updated as appropriate: allergies, current medications, past family history, past medical history, past social history, past surgical history and problem list.    PAST PSYCHIATRIC HISTORY  Axis I  Affective/Bipoloar Disorder, Anxiety/Panic Disorder, Attention Deficit Disorder  Axis II  None    PAST OUTPATIENT TREATMENT  Diagnosis treated:  Affective Disorder, Anxiety/Panic Disorder, ADD  Treatment Type:  Individual Therapy, Medication Management  Genesight testing done December 2021, normal converter of folic acid  Prior Psychiatric Medications:  Xanax  Adderall   Klonopin  Vraylar, helpful  Sertraline  Lamictal  Prozac  Support Groups:  None  Sequelae Of Mental Disorder:  job disruption, social isolation, family disruption, emotional distress      Interval History  No change    Side Effects  None    Past psychiatric history was reviewed and compared to 7/28/22 visit and appropriate updates were made.    Past Medical History:  Past Medical History:   Diagnosis Date   • ADHD (attention deficit hyperactivity disorder)    • Anxiety    • Bipolar disorder (HCC)        Social History:  Social History     Socioeconomic History   • Marital status: Single   Tobacco Use   • Smoking status: Current Some Day Smoker   • Smokeless tobacco: Never Used   Substance and Sexual Activity   • Alcohol use: Not Currently   • Drug use: Yes     Types: Marijuana   • Sexual activity: Defer       Family History:  Family History   Problem Relation Age of Onset   • Depression Mother    • Anxiety disorder Mother        Past Surgical History:  No past surgical history on file.    Problem List:  Patient Active Problem List   Diagnosis   • ADHD (attention deficit hyperactivity disorder) "   • FLOR (generalized anxiety disorder)   • Bipolar affective disorder, mixed (HCC)       Allergy:   Allergies   Allergen Reactions   • Penicillins Anaphylaxis   • Rocephin [Ceftriaxone] Anaphylaxis        Discontinued Medications:  Medications Discontinued During This Encounter   Medication Reason   • lamoTRIgine (LaMICtal) 100 MG tablet Reorder   • Vraylar 1.5 MG capsule capsule Reorder       Current Medications:   Current Outpatient Medications   Medication Sig Dispense Refill   • Cariprazine HCl (Vraylar) 1.5 MG capsule capsule Take 1 capsule by mouth Daily. 90 capsule 1   • lamoTRIgine (LaMICtal) 100 MG tablet Take 1 tablet by mouth 2 (Two) Times a Day. For mood stabilizer 180 tablet 1   • amphetamine-dextroamphetamine (Adderall) 10 MG tablet Take one tablet at 4-pm for breakthrough ADHD symptoms 30 tablet 0   • amphetamine-dextroamphetamine XR (Adderall XR) 30 MG 24 hr capsule Take 1 capsule by mouth Every Morning 30 capsule 0   • clonazePAM (KlonoPIN) 1 MG tablet TAKE 1 TABLET BY MOUTH 3 TIMES A DAY AS NEEDED FOR ANXIETY. 90 tablet 2     No current facility-administered medications for this visit.         Psychological ROS: positive for - anxiety, concentration difficulties, depression, irritability, memory difficulties, mood swings and sleep disturbances  negative for - behavioral disorder, decreased libido, disorientation, hallucinations, hostility, obsessive thoughts, physical abuse, sexual abuse or suicidal ideation      Physical Exam:   There were no vitals taken for this visit.    Mental Status Exam:   Hygiene:   good  Cooperation:  Cooperative  Eye Contact:  Good  Psychomotor Behavior:  Appropriate  Affect: Appropriate  Mood: Normal  Hopelessness: Denies  Speech:  Normal  Thought Process:  Goal directed  Thought Content:  Normal  Suicidal:  None  Homicidal:  None  Hallucinations:  None  Delusion:  None  Memory:  Intact  Orientation:  Person, Place, Time and Situation  Reliability:  good  Insight:   Good  Judgement:  Good  Impulse Control:  Good  Physical/Medical Issues:  No      Mental status exam was reviewed and compared to 7/28/22  visit and appropriate updates were made.    PHQ-9 Depression Screening  Little interest or pleasure in doing things? (P) 1   Feeling down, depressed, or hopeless? (P) 1   Trouble falling or staying asleep, or sleeping too much? (P) 1   Feeling tired or having little energy? (P) 1   Poor appetite or overeating? (P) 1   Feeling bad about yourself - or that you are a failure or have let yourself or your family down?     Trouble concentrating on things, such as reading the newspaper or watching television? (P) 0   Moving or speaking so slowly that other people could have noticed? Or the opposite - being so fidgety or restless that you have been moving around a lot more than usual? (P) 0   Thoughts that you would be better off dead, or of hurting yourself in some way? (P) 0   PHQ-9 Total Score (P) 5   If you checked off any problems, how difficult have these problems made it for you to do your work, take care of things at home, or get along with other people? (P) Not difficult at all           Current some day smoker less than 3 minutes spent counseling Has reduced Tobbacos use    I advised Magan of the risks of tobacco use.     Lab Results:   No visits with results within 3 Month(s) from this visit.   Latest known visit with results is:   No results found for any previous visit.       Assessment & Plan   Problems Addressed this Visit        Mental Health    ADHD (attention deficit hyperactivity disorder) (Chronic)    Relevant Medications    Cariprazine HCl (Vraylar) 1.5 MG capsule capsule    Other Relevant Orders    Pain Management Profile (13 Drugs) Urine - Urine, Clean Catch    FLOR (generalized anxiety disorder) (Chronic)    Relevant Medications    Cariprazine HCl (Vraylar) 1.5 MG capsule capsule    Other Relevant Orders    Pain Management Profile (13 Drugs) Urine - Urine, Clean  Catch    Bipolar affective disorder, mixed (HCC) - Primary (Chronic)    Relevant Medications    Cariprazine HCl (Vraylar) 1.5 MG capsule capsule      Diagnoses       Codes Comments    Bipolar affective disorder, mixed (HCC)    -  Primary ICD-10-CM: F31.60  ICD-9-CM: 296.60     FLOR (generalized anxiety disorder)     ICD-10-CM: F41.1  ICD-9-CM: 300.02     Attention deficit hyperactivity disorder (ADHD), predominantly inattentive type     ICD-10-CM: F90.0  ICD-9-CM: 314.00           Visit Diagnoses:    ICD-10-CM ICD-9-CM   1. Bipolar affective disorder, mixed (HCC)  F31.60 296.60   2. FLOR (generalized anxiety disorder)  F41.1 300.02   3. Attention deficit hyperactivity disorder (ADHD), predominantly inattentive type  F90.0 314.00       TREATMENT PLAN/GOALS: Continue supportive psychotherapy efforts and medications as indicated. Treatment and medication options discussed during today's visit. Patient ackowledged and verbally consented to continue with current treatment plan and was educated on the importance of compliance with treatment and follow-up appointments.    MEDICATION ISSUES:  INSPECT reviewed as expected  Discussed medication options and treatment plan of prescribed medication as well as the risks, benefits, and side effects including potential falls, possible impaired driving and metabolic adversities among others. Patient is agreeable to call the office with any worsening of symptoms or onset of side effects. Patient is agreeable to call 911 or go to the nearest ER should he/she begin having SI/HI. No medication side effects or related complaints today.     Patient with a long history of ADHD, bipolar disorder and anxiety.  Patient reports doing much better on his current medications.  Continue Vraylar 1.5 mg daily for bipolar, use as directed per [facility].  Continue Adderall XR 30 mg capsule every morning for ADD, +10 mg IR tablet in the afternoon as needed, last filled 8/31/22 per [facility]  Continue  Klonopin 1 mg tablet 3 times daily as needed for anxiety, encouraged him to start decreasing the frequency of dosing.  Continue Lamictal 100 mg twice daily for mood stabilizer.  Continue counseling  Urine drug screen ordered for the next week at [facility]    MEDS ORDERED DURING VISIT:  New Medications Ordered This Visit   Medications   • Cariprazine HCl (Vraylar) 1.5 MG capsule capsule     Sig: Take 1 capsule by mouth Daily.     Dispense:  90 capsule     Refill:  1   • lamoTRIgine (LaMICtal) 100 MG tablet     Sig: Take 1 tablet by mouth 2 (Two) Times a Day. For mood stabilizer     Dispense:  180 tablet     Refill:  1       Return in about 3 months (around 12/13/2022) for video visit.        This document has been electronically signed by Thania Reyes PA-C  September 14, 2022 07:39 EDT    Part of this note may be an electronic transcription/translation of spoken language to printed text using the Dragon Dictation System.

## 2022-09-20 ENCOUNTER — TRANSCRIBE ORDERS (OUTPATIENT)
Dept: ADMINISTRATIVE | Facility: HOSPITAL | Age: 32
End: 2022-09-20

## 2022-09-20 DIAGNOSIS — F90.0 ATTENTION DEFICIT HYPERACTIVITY DISORDER (ADHD), PREDOMINANTLY INATTENTIVE TYPE: ICD-10-CM

## 2022-09-20 DIAGNOSIS — F90.0 ADHD, PREDOMINANTLY INATTENTIVE TYPE: ICD-10-CM

## 2022-09-20 DIAGNOSIS — F41.1 GAD (GENERALIZED ANXIETY DISORDER): Primary | ICD-10-CM

## 2022-09-20 DIAGNOSIS — F41.1 GENERALIZED ANXIETY DISORDER: Primary | ICD-10-CM

## 2022-09-26 DIAGNOSIS — F41.1 GAD (GENERALIZED ANXIETY DISORDER): Chronic | ICD-10-CM

## 2022-09-26 DIAGNOSIS — F90.0 ATTENTION DEFICIT HYPERACTIVITY DISORDER (ADHD), PREDOMINANTLY INATTENTIVE TYPE: Chronic | ICD-10-CM

## 2022-09-27 RX ORDER — DEXTROAMPHETAMINE SACCHARATE, AMPHETAMINE ASPARTATE MONOHYDRATE, DEXTROAMPHETAMINE SULFATE AND AMPHETAMINE SULFATE 7.5; 7.5; 7.5; 7.5 MG/1; MG/1; MG/1; MG/1
30 CAPSULE, EXTENDED RELEASE ORAL EVERY MORNING
Qty: 30 CAPSULE | Refills: 0 | Status: SHIPPED | OUTPATIENT
Start: 2022-09-27 | End: 2022-10-27 | Stop reason: SDUPTHER

## 2022-09-27 RX ORDER — CLONAZEPAM 1 MG/1
1 TABLET ORAL 3 TIMES DAILY PRN
Qty: 90 TABLET | Refills: 2 | OUTPATIENT
Start: 2022-09-27

## 2022-09-27 RX ORDER — DEXTROAMPHETAMINE SACCHARATE, AMPHETAMINE ASPARTATE, DEXTROAMPHETAMINE SULFATE AND AMPHETAMINE SULFATE 2.5; 2.5; 2.5; 2.5 MG/1; MG/1; MG/1; MG/1
TABLET ORAL
Qty: 30 TABLET | Refills: 0 | Status: SHIPPED | OUTPATIENT
Start: 2022-09-27 | End: 2022-10-27 | Stop reason: SDUPTHER

## 2022-10-27 ENCOUNTER — LAB (OUTPATIENT)
Dept: LAB | Facility: HOSPITAL | Age: 32
End: 2022-10-27

## 2022-10-27 DIAGNOSIS — F41.1 GAD (GENERALIZED ANXIETY DISORDER): Chronic | ICD-10-CM

## 2022-10-27 DIAGNOSIS — F90.0 ATTENTION DEFICIT HYPERACTIVITY DISORDER (ADHD), PREDOMINANTLY INATTENTIVE TYPE: Chronic | ICD-10-CM

## 2022-10-27 DIAGNOSIS — F41.1 GAD (GENERALIZED ANXIETY DISORDER): ICD-10-CM

## 2022-10-27 DIAGNOSIS — F90.0 ADHD, PREDOMINANTLY INATTENTIVE TYPE: ICD-10-CM

## 2022-10-27 DIAGNOSIS — F90.0 ATTENTION DEFICIT HYPERACTIVITY DISORDER (ADHD), PREDOMINANTLY INATTENTIVE TYPE: ICD-10-CM

## 2022-10-27 DIAGNOSIS — F41.1 GENERALIZED ANXIETY DISORDER: ICD-10-CM

## 2022-10-27 LAB
BILIRUB UR QL STRIP: NEGATIVE
CLARITY UR: CLEAR
COLOR UR: YELLOW
GLUCOSE UR STRIP-MCNC: NEGATIVE MG/DL
HGB UR QL STRIP.AUTO: NEGATIVE
KETONES UR QL STRIP: NEGATIVE
LEUKOCYTE ESTERASE UR QL STRIP.AUTO: NEGATIVE
NITRITE UR QL STRIP: NEGATIVE
PH UR STRIP.AUTO: 7.5 [PH] (ref 5–8)
PROT UR QL STRIP: NEGATIVE
SP GR UR STRIP: 1.01 (ref 1–1.03)
UROBILINOGEN UR QL STRIP: NORMAL

## 2022-10-27 PROCEDURE — 81003 URINALYSIS AUTO W/O SCOPE: CPT

## 2022-10-27 PROCEDURE — 80307 DRUG TEST PRSMV CHEM ANLYZR: CPT

## 2022-10-27 RX ORDER — CLONAZEPAM 1 MG/1
1 TABLET ORAL 3 TIMES DAILY PRN
Qty: 90 TABLET | Refills: 1 | Status: SHIPPED | OUTPATIENT
Start: 2022-10-27 | End: 2023-01-23

## 2022-10-27 RX ORDER — DEXTROAMPHETAMINE SACCHARATE, AMPHETAMINE ASPARTATE, DEXTROAMPHETAMINE SULFATE AND AMPHETAMINE SULFATE 2.5; 2.5; 2.5; 2.5 MG/1; MG/1; MG/1; MG/1
TABLET ORAL
Qty: 30 TABLET | Refills: 0 | OUTPATIENT
Start: 2022-10-27

## 2022-10-27 RX ORDER — DEXTROAMPHETAMINE SACCHARATE, AMPHETAMINE ASPARTATE, DEXTROAMPHETAMINE SULFATE AND AMPHETAMINE SULFATE 2.5; 2.5; 2.5; 2.5 MG/1; MG/1; MG/1; MG/1
TABLET ORAL
Qty: 30 TABLET | Refills: 0 | Status: SHIPPED | OUTPATIENT
Start: 2022-10-27 | End: 2022-11-21 | Stop reason: SDUPTHER

## 2022-10-27 RX ORDER — DEXTROAMPHETAMINE SACCHARATE, AMPHETAMINE ASPARTATE MONOHYDRATE, DEXTROAMPHETAMINE SULFATE AND AMPHETAMINE SULFATE 7.5; 7.5; 7.5; 7.5 MG/1; MG/1; MG/1; MG/1
30 CAPSULE, EXTENDED RELEASE ORAL EVERY MORNING
Qty: 30 CAPSULE | Refills: 0 | OUTPATIENT
Start: 2022-10-27

## 2022-10-27 RX ORDER — DEXTROAMPHETAMINE SACCHARATE, AMPHETAMINE ASPARTATE MONOHYDRATE, DEXTROAMPHETAMINE SULFATE AND AMPHETAMINE SULFATE 7.5; 7.5; 7.5; 7.5 MG/1; MG/1; MG/1; MG/1
30 CAPSULE, EXTENDED RELEASE ORAL EVERY MORNING
Qty: 30 CAPSULE | Refills: 0 | Status: SHIPPED | OUTPATIENT
Start: 2022-10-27 | End: 2022-11-21 | Stop reason: SDUPTHER

## 2022-11-02 LAB
AMPHETAMINES UR QL: POSITIVE
BARBITURATES UR QL SCN: NEGATIVE NG/ML
BENZODIAZ UR QL SCN: NEGATIVE NG/ML
BZE UR QL SCN: NEGATIVE NG/ML
CANNABINOIDS UR QL CFM: POSITIVE
CREAT UR-MCNC: 40.7 MG/DL (ref 20–300)
FENTANYL UR-MCNC: NEGATIVE PG/ML
LABORATORY COMMENT REPORT: ABNORMAL
MEPERIDINE UR QL: NEGATIVE NG/ML
METHADONE UR QL SCN: NEGATIVE NG/ML
OPIATES UR QL SCN: NEGATIVE NG/ML
OXYCODONE+OXYMORPHONE UR QL SCN: NEGATIVE NG/ML
PCP UR QL: NEGATIVE NG/ML
PH UR: 7.2 [PH] (ref 4.5–8.9)
PROPOXYPH UR QL SCN: NEGATIVE NG/ML
SP GR UR: 1
TRAMADOL UR QL SCN: NEGATIVE NG/ML

## 2022-11-21 DIAGNOSIS — F90.0 ATTENTION DEFICIT HYPERACTIVITY DISORDER (ADHD), PREDOMINANTLY INATTENTIVE TYPE: Chronic | ICD-10-CM

## 2022-11-22 RX ORDER — LAMOTRIGINE 100 MG/1
100 TABLET ORAL 2 TIMES DAILY
Qty: 180 TABLET | Refills: 1 | OUTPATIENT
Start: 2022-11-22 | End: 2023-11-22

## 2022-11-22 RX ORDER — DEXTROAMPHETAMINE SACCHARATE, AMPHETAMINE ASPARTATE MONOHYDRATE, DEXTROAMPHETAMINE SULFATE AND AMPHETAMINE SULFATE 7.5; 7.5; 7.5; 7.5 MG/1; MG/1; MG/1; MG/1
30 CAPSULE, EXTENDED RELEASE ORAL EVERY MORNING
Qty: 30 CAPSULE | Refills: 0 | Status: SHIPPED | OUTPATIENT
Start: 2022-11-22 | End: 2022-12-21

## 2022-11-22 RX ORDER — DEXTROAMPHETAMINE SACCHARATE, AMPHETAMINE ASPARTATE, DEXTROAMPHETAMINE SULFATE AND AMPHETAMINE SULFATE 2.5; 2.5; 2.5; 2.5 MG/1; MG/1; MG/1; MG/1
TABLET ORAL
Qty: 30 TABLET | Refills: 0 | Status: SHIPPED | OUTPATIENT
Start: 2022-11-22 | End: 2022-12-21

## 2022-11-22 NOTE — TELEPHONE ENCOUNTER
Tried calling the pt and someone picked up but did not respond.  Sent pt a Movityhart message for the pt to call the office.

## 2022-11-22 NOTE — TELEPHONE ENCOUNTER
I sent new rx for the Adderall XR and IR meds but on 9/13/22, I sent 6 month worth of refills for both the Lamictal and the Vraylar, so he needs to reach out to the pharmacy for those refills until March 2023 when those refills will run out.

## 2022-12-08 ENCOUNTER — TELEMEDICINE (OUTPATIENT)
Dept: PSYCHIATRY | Facility: CLINIC | Age: 32
End: 2022-12-08

## 2022-12-08 DIAGNOSIS — F90.0 ATTENTION DEFICIT HYPERACTIVITY DISORDER (ADHD), PREDOMINANTLY INATTENTIVE TYPE: Primary | Chronic | ICD-10-CM

## 2022-12-08 DIAGNOSIS — F31.60 BIPOLAR AFFECTIVE DISORDER, MIXED: Chronic | ICD-10-CM

## 2022-12-08 DIAGNOSIS — F41.1 GAD (GENERALIZED ANXIETY DISORDER): Chronic | ICD-10-CM

## 2022-12-08 PROCEDURE — 99214 OFFICE O/P EST MOD 30 MIN: CPT | Performed by: PHYSICIAN ASSISTANT
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
## 2022-12-20 DIAGNOSIS — F90.0 ATTENTION DEFICIT HYPERACTIVITY DISORDER (ADHD), PREDOMINANTLY INATTENTIVE TYPE: Chronic | ICD-10-CM

## 2022-12-21 DIAGNOSIS — F90.0 ATTENTION DEFICIT HYPERACTIVITY DISORDER (ADHD), PREDOMINANTLY INATTENTIVE TYPE: Primary | ICD-10-CM

## 2022-12-21 RX ORDER — DEXTROAMPHETAMINE SACCHARATE, AMPHETAMINE ASPARTATE MONOHYDRATE, DEXTROAMPHETAMINE SULFATE AND AMPHETAMINE SULFATE 7.5; 7.5; 7.5; 7.5 MG/1; MG/1; MG/1; MG/1
30 CAPSULE, EXTENDED RELEASE ORAL EVERY MORNING
Qty: 30 CAPSULE | Refills: 0 | Status: SHIPPED | OUTPATIENT
Start: 2022-12-21 | End: 2023-01-23

## 2022-12-21 RX ORDER — DEXTROAMPHETAMINE SACCHARATE, AMPHETAMINE ASPARTATE, DEXTROAMPHETAMINE SULFATE AND AMPHETAMINE SULFATE 2.5; 2.5; 2.5; 2.5 MG/1; MG/1; MG/1; MG/1
TABLET ORAL
Qty: 30 TABLET | Refills: 0 | OUTPATIENT
Start: 2022-12-21

## 2022-12-21 RX ORDER — DEXTROAMPHETAMINE SACCHARATE, AMPHETAMINE ASPARTATE MONOHYDRATE, DEXTROAMPHETAMINE SULFATE AND AMPHETAMINE SULFATE 7.5; 7.5; 7.5; 7.5 MG/1; MG/1; MG/1; MG/1
30 CAPSULE, EXTENDED RELEASE ORAL EVERY MORNING
Qty: 30 CAPSULE | Refills: 0 | OUTPATIENT
Start: 2022-12-21

## 2022-12-21 RX ORDER — DEXTROAMPHETAMINE SACCHARATE, AMPHETAMINE ASPARTATE, DEXTROAMPHETAMINE SULFATE AND AMPHETAMINE SULFATE 2.5; 2.5; 2.5; 2.5 MG/1; MG/1; MG/1; MG/1
TABLET ORAL
Qty: 30 TABLET | Refills: 0 | Status: SHIPPED | OUTPATIENT
Start: 2022-12-21 | End: 2023-01-23

## 2022-12-26 RX ORDER — CARIPRAZINE 1.5 MG/1
CAPSULE, GELATIN COATED ORAL
Qty: 30 CAPSULE | Refills: 3 | OUTPATIENT
Start: 2022-12-26

## 2023-01-23 DIAGNOSIS — F41.1 GAD (GENERALIZED ANXIETY DISORDER): Chronic | ICD-10-CM

## 2023-01-23 DIAGNOSIS — F90.0 ATTENTION DEFICIT HYPERACTIVITY DISORDER (ADHD), PREDOMINANTLY INATTENTIVE TYPE: ICD-10-CM

## 2023-01-23 DIAGNOSIS — F90.0 ATTENTION DEFICIT HYPERACTIVITY DISORDER (ADHD), PREDOMINANTLY INATTENTIVE TYPE: Primary | ICD-10-CM

## 2023-01-23 RX ORDER — DEXTROAMPHETAMINE SACCHARATE, AMPHETAMINE ASPARTATE MONOHYDRATE, DEXTROAMPHETAMINE SULFATE AND AMPHETAMINE SULFATE 7.5; 7.5; 7.5; 7.5 MG/1; MG/1; MG/1; MG/1
30 CAPSULE, EXTENDED RELEASE ORAL EVERY MORNING
Qty: 30 CAPSULE | Refills: 0 | Status: SHIPPED | OUTPATIENT
Start: 2023-01-23 | End: 2023-02-07 | Stop reason: SDUPTHER

## 2023-01-23 RX ORDER — CLONAZEPAM 1 MG/1
1 TABLET ORAL 3 TIMES DAILY PRN
Qty: 90 TABLET | Refills: 1 | OUTPATIENT
Start: 2023-01-23

## 2023-01-23 RX ORDER — DEXTROAMPHETAMINE SACCHARATE, AMPHETAMINE ASPARTATE, DEXTROAMPHETAMINE SULFATE AND AMPHETAMINE SULFATE 2.5; 2.5; 2.5; 2.5 MG/1; MG/1; MG/1; MG/1
TABLET ORAL
Qty: 30 TABLET | Refills: 0 | Status: SHIPPED | OUTPATIENT
Start: 2023-01-23 | End: 2023-01-28 | Stop reason: SDUPTHER

## 2023-01-23 RX ORDER — CLONAZEPAM 1 MG/1
TABLET ORAL
Qty: 90 TABLET | Refills: 2 | Status: SHIPPED | OUTPATIENT
Start: 2023-01-23

## 2023-01-23 RX ORDER — DEXTROAMPHETAMINE SACCHARATE, AMPHETAMINE ASPARTATE, DEXTROAMPHETAMINE SULFATE AND AMPHETAMINE SULFATE 2.5; 2.5; 2.5; 2.5 MG/1; MG/1; MG/1; MG/1
TABLET ORAL
Qty: 30 TABLET | Refills: 0 | OUTPATIENT
Start: 2023-01-23

## 2023-01-23 RX ORDER — DEXTROAMPHETAMINE SACCHARATE, AMPHETAMINE ASPARTATE MONOHYDRATE, DEXTROAMPHETAMINE SULFATE AND AMPHETAMINE SULFATE 7.5; 7.5; 7.5; 7.5 MG/1; MG/1; MG/1; MG/1
30 CAPSULE, EXTENDED RELEASE ORAL EVERY MORNING
Qty: 30 CAPSULE | Refills: 0 | OUTPATIENT
Start: 2023-01-23

## 2023-01-28 DIAGNOSIS — F90.0 ATTENTION DEFICIT HYPERACTIVITY DISORDER (ADHD), PREDOMINANTLY INATTENTIVE TYPE: ICD-10-CM

## 2023-01-28 RX ORDER — DEXTROAMPHETAMINE SACCHARATE, AMPHETAMINE ASPARTATE MONOHYDRATE, DEXTROAMPHETAMINE SULFATE AND AMPHETAMINE SULFATE 7.5; 7.5; 7.5; 7.5 MG/1; MG/1; MG/1; MG/1
30 CAPSULE, EXTENDED RELEASE ORAL EVERY MORNING
Qty: 30 CAPSULE | Refills: 0 | Status: CANCELLED | OUTPATIENT
Start: 2023-01-28

## 2023-02-07 RX ORDER — DEXTROAMPHETAMINE SACCHARATE, AMPHETAMINE ASPARTATE, DEXTROAMPHETAMINE SULFATE AND AMPHETAMINE SULFATE 2.5; 2.5; 2.5; 2.5 MG/1; MG/1; MG/1; MG/1
TABLET ORAL
Qty: 30 TABLET | Refills: 0 | Status: SHIPPED | OUTPATIENT
Start: 2023-02-07

## 2023-02-07 RX ORDER — DEXTROAMPHETAMINE SACCHARATE, AMPHETAMINE ASPARTATE MONOHYDRATE, DEXTROAMPHETAMINE SULFATE AND AMPHETAMINE SULFATE 7.5; 7.5; 7.5; 7.5 MG/1; MG/1; MG/1; MG/1
30 CAPSULE, EXTENDED RELEASE ORAL EVERY MORNING
Qty: 30 CAPSULE | Refills: 0 | Status: SHIPPED | OUTPATIENT
Start: 2023-02-07 | End: 2023-03-28 | Stop reason: SDUPTHER

## 2023-02-07 NOTE — TELEPHONE ENCOUNTER
I have sent the Adderall Rx to [facility] pharmacy instead since they have the XR 30 mg in stock.  Can you please cancel the other Rx that was sent to [facility]?  Thank you

## 2023-03-20 RX ORDER — LAMOTRIGINE 100 MG/1
100 TABLET ORAL 2 TIMES DAILY
Qty: 180 TABLET | Refills: 1 | Status: SHIPPED | OUTPATIENT
Start: 2023-03-20 | End: 2024-03-19

## 2023-03-27 DIAGNOSIS — F90.0 ATTENTION DEFICIT HYPERACTIVITY DISORDER (ADHD), PREDOMINANTLY INATTENTIVE TYPE: ICD-10-CM

## 2023-03-27 RX ORDER — DEXTROAMPHETAMINE SACCHARATE, AMPHETAMINE ASPARTATE MONOHYDRATE, DEXTROAMPHETAMINE SULFATE AND AMPHETAMINE SULFATE 7.5; 7.5; 7.5; 7.5 MG/1; MG/1; MG/1; MG/1
30 CAPSULE, EXTENDED RELEASE ORAL EVERY MORNING
Qty: 30 CAPSULE | Refills: 0 | Status: CANCELLED | OUTPATIENT
Start: 2023-03-27

## 2023-03-27 NOTE — TELEPHONE ENCOUNTER
Pt called back to explain his refill request. Pt states that he is out of his Adderall. Pt states that nowhere has the XR 30 mg Adderall. Pt states [facility] [place] [facility] [address] [facility] Adderall 10 mg reg tab on hand.  Pt ask if the prescription can be adjusted for him to take the Adderall 10 mg  Until somewhere has some of the XR.    Please Advise.  [facility]  [address] [place] [address] [phone]

## 2023-03-28 DIAGNOSIS — F90.0 ATTENTION DEFICIT HYPERACTIVITY DISORDER (ADHD), PREDOMINANTLY INATTENTIVE TYPE: ICD-10-CM

## 2023-03-28 RX ORDER — DEXTROAMPHETAMINE SACCHARATE, AMPHETAMINE ASPARTATE, DEXTROAMPHETAMINE SULFATE AND AMPHETAMINE SULFATE 2.5; 2.5; 2.5; 2.5 MG/1; MG/1; MG/1; MG/1
TABLET ORAL
Qty: 30 TABLET | Refills: 0 | OUTPATIENT
Start: 2023-03-28

## 2023-03-28 RX ORDER — DEXTROAMPHETAMINE SACCHARATE, AMPHETAMINE ASPARTATE MONOHYDRATE, DEXTROAMPHETAMINE SULFATE AND AMPHETAMINE SULFATE 7.5; 7.5; 7.5; 7.5 MG/1; MG/1; MG/1; MG/1
30 CAPSULE, EXTENDED RELEASE ORAL EVERY MORNING
Qty: 30 CAPSULE | Refills: 0 | Status: SHIPPED | OUTPATIENT
Start: 2023-03-28 | End: 2023-03-29 | Stop reason: ALTCHOICE

## 2023-03-28 RX ORDER — DEXTROAMPHETAMINE SACCHARATE, AMPHETAMINE ASPARTATE MONOHYDRATE, DEXTROAMPHETAMINE SULFATE AND AMPHETAMINE SULFATE 7.5; 7.5; 7.5; 7.5 MG/1; MG/1; MG/1; MG/1
30 CAPSULE, EXTENDED RELEASE ORAL EVERY MORNING
Qty: 30 CAPSULE | Refills: 0 | Status: SHIPPED | OUTPATIENT
Start: 2023-03-28 | End: 2023-03-28 | Stop reason: SDUPTHER

## 2023-03-28 NOTE — TELEPHONE ENCOUNTER
Pt called stating that [facility] pharmacy is out of stock of the Adderall.I called [facility] to cancel the script. Pt is requesting for the Adderrall sent to the [facility] pharmacy that is attached to the note. Provider out of the office. Please advise

## 2023-03-28 NOTE — TELEPHONE ENCOUNTER
Could not leave a vm the mailbox was not set up. Sent pt a mychart message for him to call the office.

## 2023-03-29 DIAGNOSIS — F90.0 ATTENTION DEFICIT HYPERACTIVITY DISORDER (ADHD), PREDOMINANTLY INATTENTIVE TYPE: Primary | ICD-10-CM

## 2023-03-29 RX ORDER — AMPHETAMINE 18.8 MG/1
18.8 TABLET, ORALLY DISINTEGRATING ORAL EVERY MORNING
Qty: 30 EACH | Refills: 0 | Status: SHIPPED | OUTPATIENT
Start: 2023-03-29 | End: 2023-03-29 | Stop reason: ALTCHOICE

## 2023-03-29 RX ORDER — DEXTROAMPHETAMINE SULFATE, DEXTROAMPHETAMINE SACCHARATE, AMPHETAMINE SULFATE AND AMPHETAMINE ASPARTATE 7.5; 7.5; 7.5; 7.5 MG/1; MG/1; MG/1; MG/1
30 CAPSULE, EXTENDED RELEASE ORAL EVERY MORNING
Qty: 30 CAPSULE | Refills: 0 | Status: SHIPPED | OUTPATIENT
Start: 2023-03-29

## 2023-03-29 NOTE — TELEPHONE ENCOUNTER
Called and cancelled the [facility]zenys that was [facility] to [facility] [place].    Made pt aware that the Adderall XR 30 mg was sent to the pharmacy.

## 2023-03-29 NOTE — TELEPHONE ENCOUNTER
Called both [facility] pharmacies and the one in [place] where the Adderall was sent had not filled the prescription so it has been cancelled.  The [facility] in [place] is holding the Adzenys until we reach back out to them to let them know if it is ok to go ahead and fill the Adzenys. There was some confusion on the pt part they thought the Adderall was able to be filled but it was not.    Spoke to pt and he states that the [place] pharmacy has named brand Adderall XR 30 mg available. The pt states it may need a PA but he isn't sure.    Please advise

## 2023-03-29 NOTE — PROGRESS NOTES
Insurance will not cover that many Adderall 10 mg tabs to equal 30 mg dosage.  Let's try the Adzenyis instead of the Adderall XR and see if we can get a PA

## 2023-04-10 ENCOUNTER — TELEMEDICINE (OUTPATIENT)
Dept: PSYCHIATRY | Facility: CLINIC | Age: 33
End: 2023-04-10
Payer: MEDICAID

## 2023-04-10 DIAGNOSIS — F90.0 ATTENTION DEFICIT HYPERACTIVITY DISORDER (ADHD), PREDOMINANTLY INATTENTIVE TYPE: Primary | Chronic | ICD-10-CM

## 2023-04-10 DIAGNOSIS — F41.1 GAD (GENERALIZED ANXIETY DISORDER): Chronic | ICD-10-CM

## 2023-04-10 DIAGNOSIS — F31.60 BIPOLAR AFFECTIVE DISORDER, MIXED: Chronic | ICD-10-CM

## 2023-04-10 PROCEDURE — 1160F RVW MEDS BY RX/DR IN RCRD: CPT | Performed by: PHYSICIAN ASSISTANT

## 2023-04-10 PROCEDURE — 99214 OFFICE O/P EST MOD 30 MIN: CPT | Performed by: PHYSICIAN ASSISTANT

## 2023-04-10 PROCEDURE — 1159F MED LIST DOCD IN RCRD: CPT | Performed by: PHYSICIAN ASSISTANT
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
## 2023-04-21 DIAGNOSIS — F90.0 ATTENTION DEFICIT HYPERACTIVITY DISORDER (ADHD), PREDOMINANTLY INATTENTIVE TYPE: ICD-10-CM

## 2023-04-23 DIAGNOSIS — F41.1 GAD (GENERALIZED ANXIETY DISORDER): Chronic | ICD-10-CM

## 2023-04-24 DIAGNOSIS — F41.1 GAD (GENERALIZED ANXIETY DISORDER): Chronic | ICD-10-CM

## 2023-04-24 RX ORDER — DEXTROAMPHETAMINE SULFATE, DEXTROAMPHETAMINE SACCHARATE, AMPHETAMINE SULFATE AND AMPHETAMINE ASPARTATE 7.5; 7.5; 7.5; 7.5 MG/1; MG/1; MG/1; MG/1
30 CAPSULE, EXTENDED RELEASE ORAL EVERY MORNING
Qty: 30 CAPSULE | Refills: 0 | Status: SHIPPED | OUTPATIENT
Start: 2023-04-24 | End: 2023-04-24

## 2023-04-24 RX ORDER — CLONAZEPAM 1 MG/1
1 TABLET ORAL 3 TIMES DAILY PRN
Qty: 90 TABLET | Refills: 2 | OUTPATIENT
Start: 2023-04-24

## 2023-04-24 RX ORDER — CLONAZEPAM 1 MG/1
TABLET ORAL
Qty: 90 TABLET | Refills: 2 | Status: SHIPPED | OUTPATIENT
Start: 2023-04-24

## 2023-04-24 RX ORDER — DEXTROAMPHETAMINE SACCHARATE, AMPHETAMINE ASPARTATE, DEXTROAMPHETAMINE SULFATE AND AMPHETAMINE SULFATE 2.5; 2.5; 2.5; 2.5 MG/1; MG/1; MG/1; MG/1
TABLET ORAL
Qty: 30 TABLET | Refills: 0 | Status: SHIPPED | OUTPATIENT
Start: 2023-04-24

## 2023-04-24 RX ORDER — DEXTROAMPHETAMINE SACCHARATE, AMPHETAMINE ASPARTATE MONOHYDRATE, DEXTROAMPHETAMINE SULFATE AND AMPHETAMINE SULFATE 7.5; 7.5; 7.5; 7.5 MG/1; MG/1; MG/1; MG/1
30 CAPSULE, EXTENDED RELEASE ORAL EVERY MORNING
Qty: 30 CAPSULE | Refills: 0 | Status: SHIPPED | OUTPATIENT
Start: 2023-04-24

## 2023-05-22 DIAGNOSIS — F90.0 ATTENTION DEFICIT HYPERACTIVITY DISORDER (ADHD), PREDOMINANTLY INATTENTIVE TYPE: ICD-10-CM

## 2023-05-22 RX ORDER — DEXTROAMPHETAMINE SACCHARATE, AMPHETAMINE ASPARTATE MONOHYDRATE, DEXTROAMPHETAMINE SULFATE AND AMPHETAMINE SULFATE 7.5; 7.5; 7.5; 7.5 MG/1; MG/1; MG/1; MG/1
30 CAPSULE, EXTENDED RELEASE ORAL EVERY MORNING
Qty: 30 CAPSULE | Refills: 0 | Status: SHIPPED | OUTPATIENT
Start: 2023-05-22

## 2023-05-22 RX ORDER — DEXTROAMPHETAMINE SACCHARATE, AMPHETAMINE ASPARTATE, DEXTROAMPHETAMINE SULFATE AND AMPHETAMINE SULFATE 2.5; 2.5; 2.5; 2.5 MG/1; MG/1; MG/1; MG/1
TABLET ORAL
Qty: 30 TABLET | Refills: 0 | Status: SHIPPED | OUTPATIENT
Start: 2023-05-22

## 2023-08-17 DIAGNOSIS — F90.0 ATTENTION DEFICIT HYPERACTIVITY DISORDER (ADHD), PREDOMINANTLY INATTENTIVE TYPE: ICD-10-CM

## 2023-08-17 RX ORDER — DEXTROAMPHETAMINE SACCHARATE, AMPHETAMINE ASPARTATE, DEXTROAMPHETAMINE SULFATE AND AMPHETAMINE SULFATE 2.5; 2.5; 2.5; 2.5 MG/1; MG/1; MG/1; MG/1
TABLET ORAL
Qty: 30 TABLET | Refills: 0 | Status: SHIPPED | OUTPATIENT
Start: 2023-08-17

## 2023-08-17 RX ORDER — DEXTROAMPHETAMINE SACCHARATE, AMPHETAMINE ASPARTATE MONOHYDRATE, DEXTROAMPHETAMINE SULFATE AND AMPHETAMINE SULFATE 7.5; 7.5; 7.5; 7.5 MG/1; MG/1; MG/1; MG/1
30 CAPSULE, EXTENDED RELEASE ORAL EVERY MORNING
Qty: 30 CAPSULE | Refills: 0 | Status: SHIPPED | OUTPATIENT
Start: 2023-08-17

## 2023-09-13 DIAGNOSIS — F90.0 ATTENTION DEFICIT HYPERACTIVITY DISORDER (ADHD), PREDOMINANTLY INATTENTIVE TYPE: ICD-10-CM

## 2023-09-13 RX ORDER — DEXTROAMPHETAMINE SACCHARATE, AMPHETAMINE ASPARTATE, DEXTROAMPHETAMINE SULFATE AND AMPHETAMINE SULFATE 2.5; 2.5; 2.5; 2.5 MG/1; MG/1; MG/1; MG/1
TABLET ORAL
Qty: 30 TABLET | Refills: 0 | Status: SHIPPED | OUTPATIENT
Start: 2023-09-13

## 2023-09-13 RX ORDER — DEXTROAMPHETAMINE SACCHARATE, AMPHETAMINE ASPARTATE MONOHYDRATE, DEXTROAMPHETAMINE SULFATE AND AMPHETAMINE SULFATE 7.5; 7.5; 7.5; 7.5 MG/1; MG/1; MG/1; MG/1
30 CAPSULE, EXTENDED RELEASE ORAL EVERY MORNING
Qty: 30 CAPSULE | Refills: 0 | Status: SHIPPED | OUTPATIENT
Start: 2023-09-13

## 2023-09-14 RX ORDER — LAMOTRIGINE 100 MG/1
100 TABLET ORAL 2 TIMES DAILY
Qty: 180 TABLET | Refills: 1 | Status: SHIPPED | OUTPATIENT
Start: 2023-09-14 | End: 2024-09-13

## 2023-10-11 ENCOUNTER — TELEMEDICINE (OUTPATIENT)
Dept: PSYCHIATRY | Facility: CLINIC | Age: 33
End: 2023-10-11
Payer: MEDICAID

## 2023-10-11 DIAGNOSIS — F31.60 BIPOLAR AFFECTIVE DISORDER, MIXED: Primary | Chronic | ICD-10-CM

## 2023-10-11 DIAGNOSIS — F90.0 ATTENTION DEFICIT HYPERACTIVITY DISORDER (ADHD), PREDOMINANTLY INATTENTIVE TYPE: Chronic | ICD-10-CM

## 2023-10-11 DIAGNOSIS — F41.1 GAD (GENERALIZED ANXIETY DISORDER): Chronic | ICD-10-CM

## 2023-10-11 RX ORDER — CLONAZEPAM 1 MG/1
1 TABLET ORAL 3 TIMES DAILY PRN
Qty: 90 TABLET | Refills: 2 | Status: CANCELLED | OUTPATIENT
Start: 2023-10-11

## 2023-10-11 NOTE — PROGRESS NOTES
Subjective   Magan Valenzuela is a 32 y.o. male who presents today for follow up    This provider is located in [place], [place] using a secure MyChart Video Visit through [employer]. Patient is being seen remotely via telehealth at their home address in [place], and stated they are in a secure environment for this session. The patient's condition being diagnosed/treated is appropriate for telemedicine. The provider identified herself as well as her credentials.   The patient, and/or patients guardian, consent to be seen remotely, and when consent is given they understand that the consent allows for patient identifiable information to be sent to a third party as needed.   They may refuse to be seen remotely at any time. The electronic data is encrypted and password protected, and the patient and/or guardian has been advised of the potential risks to privacy not withstanding such measures.   PT Identifiers used: Name and .    You have chosen to receive care through a telehealth visit.  Do you consent to use a video/audio connection for your medical care today? Yes    Chief Complaint:  Depression, anxiety, ADHD   Things are going well, no need for med changes.     History of Present Illness:   He is back on track and doing well, working at [employer].  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  He is working two jobs now, also at [employer] 8 am to 8 pm most days and the Adderall is not lasting long enough.  Kids are doing well, kids staying with his Mom when he works.    Moved in 2022 to a Two BR apartment in [place] off [facility]  He has 3 kids, 1 yr, 2 yr and a 15 yr (adopted)  His ex has the 1 yr old and makes it difficult to see her.   He was seeing Lesa Dee NP at [facility] but then involved in a CPS case with one of his kids and they had him seeing Olimpia Espitia at [facility], who cut off all prescriptions and discharged him when urine drug screen  negative for Klonopin  ACP, saw therapist (Alex) twice a week  Dx with ADHD in the second grade and was started on Adderall per Mom  Started on Xanax in his late teens when living in [place] but did not like it all, dx with Bipolar disorder  No relationship with his father  Depression 0/10  Denies SI/HI  Anxiety 3/10,  Over thinks things  More attentive and able to complete tasks with the Adderall but with the shortage, he is having issues getting the XR right now.       The following portions of the patient's history were reviewed and updated as appropriate: allergies, current medications, past family history, past medical history, past social history, past surgical history and problem list.    PAST PSYCHIATRIC HISTORY  Axis I  Affective/Bipoloar Disorder, Anxiety/Panic Disorder, Attention Deficit Disorder  Axis II  None    PAST OUTPATIENT TREATMENT  Diagnosis treated:  Affective Disorder, Anxiety/Panic Disorder, ADD  Treatment Type:  Individual Therapy, Medication Management  Genesight testing done December 2021, normal converter of folic acid  Prior Psychiatric Medications:  Xanax  Adderall XR  Adderall IR  Klonopin  Vraylar, helpful  Sertraline  Lamictal  Prozac  Support Groups:  None  Sequelae Of Mental Disorder:  job disruption, social isolation, family disruption, emotional distress      Interval History  Stable    Side Effects  None    Past psychiatric history was reviewed and compared to 7/11/23 visit and no updates were needed.     Past Medical History:  Past Medical History:   Diagnosis Date    ADHD (attention deficit hyperactivity disorder)     Anxiety     Bipolar disorder        Social History:  Social History     Socioeconomic History    Marital status: Single   Tobacco Use    Smoking status: Some Days    Smokeless tobacco: Never   Substance and Sexual Activity    Alcohol use: Not Currently    Drug use: Yes     Types: Marijuana    Sexual activity: Defer       Family History:  Family History   Problem  Relation Age of Onset    Depression Mother     Anxiety disorder Mother        Past Surgical History:  History reviewed. No pertinent surgical history.    Problem List:  Patient Active Problem List   Diagnosis    ADHD (attention deficit hyperactivity disorder)    FLOR (generalized anxiety disorder)    Bipolar affective disorder, mixed       Allergy:   Allergies   Allergen Reactions    Penicillins Anaphylaxis    Rocephin [Ceftriaxone] Anaphylaxis        Discontinued Medications:  Medications Discontinued During This Encounter   Medication Reason    amphetamine-dextroamphetamine (Adderall) 10 MG tablet Dose adjustment    amphetamine-dextroamphetamine XR (Adderall XR) 30 MG 24 hr capsule Dose adjustment         Current Medications:   Current Outpatient Medications   Medication Sig Dispense Refill    Cariprazine HCl (Vraylar) 1.5 MG capsule capsule Take 1 capsule by mouth Daily. 90 capsule 1    clonazePAM (KlonoPIN) 1 MG tablet Take 1 tablet by mouth 3 (Three) Times a Day As Needed for Anxiety. 90 tablet 2    lamoTRIgine (LaMICtal) 100 MG tablet TAKE 1 TABLET BY MOUTH 2 (TWO) TIMES A DAY. FOR MOOD STABILIZER 180 tablet 1     No current facility-administered medications for this visit.         Psychological ROS: positive for - anxiety, concentration difficulties, depression, irritability, memory difficulties, mood swings and sleep disturbances  negative for - behavioral disorder, decreased libido, disorientation, hallucinations, hostility, obsessive thoughts, physical abuse, sexual abuse or suicidal ideation      Physical Exam:   There were no vitals taken for this visit.    Mental Status Exam:   Hygiene:   good  Cooperation:  Cooperative  Eye Contact:  Good  Psychomotor Behavior:  Appropriate  Affect: Appropriate  Mood: Normal   Hopelessness: Denies  Speech:  Normal  Thought Process:  Goal directed  Thought Content:  Normal  Suicidal:  None  Homicidal:  None  Hallucinations:  None  Delusion:  None  Memory:   Intact  Orientation:  Person, Place, Time and Situation  Reliability:  good  Insight:  Good  Judgement:  Good  Impulse Control:  Good  Physical/Medical Issues:  No      Mental status exam was reviewed and compared to 7/11/23  visit and no updates were needed, the exam was the same.      PHQ-9 Depression Screening  Little interest or pleasure in doing things? 0-->not at all   Feeling down, depressed, or hopeless? 0-->not at all   Trouble falling or staying asleep, or sleeping too much?     Feeling tired or having little energy?     Poor appetite or overeating?     Feeling bad about yourself - or that you are a failure or have let yourself or your family down?     Trouble concentrating on things, such as reading the newspaper or watching television?     Moving or speaking so slowly that other people could have noticed? Or the opposite - being so fidgety or restless that you have been moving around a lot more than usual?     Thoughts that you would be better off dead, or of hurting yourself in some way?     PHQ-9 Total Score 0   If you checked off any problems, how difficult have these problems made it for you to do your work, take care of things at home, or get along with other people?             Current some day smoker less than 3 minutes spent counseling Has reduced Tobbacos use    I advised Magan of the risks of tobacco use.     Lab Results:   No visits with results within 3 Month(s) from this visit.   Latest known visit with results is:   Lab on 10/27/2022   Component Date Value Ref Range Status    Color, UA 10/27/2022 Yellow  Yellow, Straw Final    Appearance, UA 10/27/2022 Clear  Clear Final    pH, UA 10/27/2022 7.5  5.0 - 8.0 Final    Specific Gravity, UA 10/27/2022 1.006  1.005 - 1.030 Final    Glucose, UA 10/27/2022 Negative  Negative Final    Ketones, UA 10/27/2022 Negative  Negative Final    Bilirubin, UA 10/27/2022 Negative  Negative Final    Blood, UA 10/27/2022 Negative  Negative Final    Protein, UA  10/27/2022 Negative  Negative Final    Leuk Esterase, UA 10/27/2022 Negative  Negative Final    Nitrite, UA 10/27/2022 Negative  Negative Final    Urobilinogen, UA 10/27/2022 0.2 E.U./dL  0.2 - 1.0 E.U./dL Final    Amphetamine, Urine Qual 10/27/2022 Positive (A)  Dfeiis=1141 Final    Amphetamine                 Positive        A                         01  Amphetamine Conf, MS, UR    1750               ng/mL Gioceo=721       01  Amphetamine detected; this finding can be consistent with use of  medications that include Adderall, Benzedrine, Dexadrine, Vyvanse, or  generic formulations. This drug may also be detected from use of  prescriptions that contain or metabolize to Methamphetamine, or from  use of illicit Methamphetamine. Drugs listed are representative of  common sources of the compound detected and are not intended to  include all possible sources.  Methamphetamine             Negative            Wyihcz=206            01    Barbiturates Screen, Urine 10/27/2022 Negative  Uzumev=942 ng/mL Final    Benzodiazepine Screen, Urine 10/27/2022 Negative  Vhcwpv=122 ng/mL Final    THC Screen, Urine 10/27/2022 Positive (A)  Cutoff=20 Final    Carboxy THC Conf, MS, UR    97                 ng/mL Cutoff=10        01    Cocaine Screen, Urine 10/27/2022 Negative  Cdczjp=713 ng/mL Final    Opiate Screen, Urine 10/27/2022 Negative  Tsuxij=700 ng/mL Final    Opiate test includes Codeine, Morphine, Hydromorphone, Hydrocodone.    Oxycodone/Oxymorphone, Urine 10/27/2022 Negative  Kvuibm=014 ng/mL Final    Test includes Oxycodone and Oxymorphone    Phencyclidine (PCP), Urine 10/27/2022 Negative  Cutoff=25 ng/mL Final    Methadone Screen, Urine 10/27/2022 Negative  Mxepwn=676 ng/mL Final    Propoxyphene Screen 10/27/2022 Negative  Hlphda=298 ng/mL Final    Meperidine, Urine 10/27/2022 Negative  Zhdggp=206 ng/mL Final    This test was developed and its performance characteristics  determined by [facility]. It has not been cleared or  approved  by the Food and Drug Administration.    Fentanyl, Urine 10/27/2022 Negative  Pzpvvf=2179 pg/mL Final    Test includes Fentanyl and Norfentanyl  This test was developed and its performance characteristics  determined by [employer]. It has not been cleared or approved  by the Food and Drug Administration.    Tramadol Screen, Urine 10/27/2022 Negative  Iexjrk=885 ng/mL Final    Creatinine, Urine 10/27/2022 40.7  20.0 - 300.0 mg/dL Final    Specific Gravity, UA 10/27/2022 1.005   Final    pH, UA 10/27/2022 7.2  4.5 - 8.9 Final    Please note 10/27/2022 Comment   Final    Drug-test results should be interpreted in the context of clinical  information. Patient metabolic variables, specific drug chemistry, and  specimen characteristics can affect test outcome. Technical  consultation is available if a test result is inconsistent with an  expected outcome. ([email or web address] or call toll-free  [phone])  Drug brands, if listed herein, are trademarks of their respective  owners.       Assessment & Plan   Problems Addressed this Visit          Mental Health    ADHD (attention deficit hyperactivity disorder) (Chronic)    Relevant Orders    Pain Management Profile (13 Drugs) Urine - Urine, Clean Catch    FLOR (generalized anxiety disorder) (Chronic)    Relevant Orders    Pain Management Profile (13 Drugs) Urine - Urine, Clean Catch    Bipolar affective disorder, mixed - Primary (Chronic)     Diagnoses         Codes Comments    Bipolar affective disorder, mixed    -  Primary ICD-10-CM: F31.60  ICD-9-CM: 296.60     FLOR (generalized anxiety disorder)     ICD-10-CM: F41.1  ICD-9-CM: 300.02     Attention deficit hyperactivity disorder (ADHD), predominantly inattentive type     ICD-10-CM: F90.0  ICD-9-CM: 314.00             Visit Diagnoses:    ICD-10-CM ICD-9-CM   1. Bipolar affective disorder, mixed  F31.60 296.60   2. FLOR (generalized anxiety disorder)  F41.1 300.02   3. Attention deficit hyperactivity  disorder (ADHD), predominantly inattentive type  F90.0 314.00           TREATMENT PLAN/GOALS: Continue supportive psychotherapy efforts and medications as indicated. Treatment and medication options discussed during today's visit. Patient ackowledged and verbally consented to continue with current treatment plan and was educated on the importance of compliance with treatment and follow-up appointments.    MEDICATION ISSUES:  INSPECT reviewed as expected  Discussed medication options and treatment plan of prescribed medication as well as the risks, benefits, and side effects including potential falls, possible impaired driving and metabolic adversities among others. Patient is agreeable to call the office with any worsening of symptoms or onset of side effects. Patient is agreeable to call 911 or go to the nearest ER should he/she begin having SI/HI. No medication side effects or related complaints today.     Patient with a long history of ADHD, bipolar disorder and anxiety.  Patient reports doing much better on his current medications.  Continue Vraylar 1.5 mg daily for bipolar, use as directed per [employer]  Change Adderall XR 30 mg capsule every morning to 20 mg IR TID for ADHD since his work days are so long.    Continue Klonopin 1 mg tablet 3 times daily as needed for anxiety, encouraged him to start decreasing the frequency of dosing.  Continue Lamictal 100 mg twice daily for mood stabilizer.  Continue counseling  Urine drug screen ordered to be done at  [facility] this week.       MEDS ORDERED DURING VISIT:  No orders of the defined types were placed in this encounter.      Return in about 3 months (around 1/11/2024) for video visit.          This document has been electronically signed by Thania Reyes PA-C  October 11, 2023 08:29 EDT    Part of this note may be an electronic transcription/translation of spoken language to printed text using the Dragon Dictation System.

## 2023-10-12 DIAGNOSIS — F90.0 ATTENTION DEFICIT HYPERACTIVITY DISORDER (ADHD), PREDOMINANTLY INATTENTIVE TYPE: Primary | ICD-10-CM

## 2023-10-12 DIAGNOSIS — F41.1 GAD (GENERALIZED ANXIETY DISORDER): Chronic | ICD-10-CM

## 2023-10-12 RX ORDER — CLONAZEPAM 1 MG/1
1 TABLET ORAL 3 TIMES DAILY PRN
Qty: 90 TABLET | Refills: 2 | Status: SHIPPED | OUTPATIENT
Start: 2023-10-12

## 2023-10-12 RX ORDER — DEXTROAMPHETAMINE SACCHARATE, AMPHETAMINE ASPARTATE, DEXTROAMPHETAMINE SULFATE AND AMPHETAMINE SULFATE 5; 5; 5; 5 MG/1; MG/1; MG/1; MG/1
20 TABLET ORAL 3 TIMES DAILY
Qty: 90 TABLET | Refills: 0 | Status: SHIPPED | OUTPATIENT
Start: 2023-10-12 | End: 2024-10-11

## 2023-10-19 ENCOUNTER — LAB (OUTPATIENT)
Dept: LAB | Facility: HOSPITAL | Age: 33
End: 2023-10-19
Payer: MEDICAID

## 2023-10-19 DIAGNOSIS — F41.1 GAD (GENERALIZED ANXIETY DISORDER): Chronic | ICD-10-CM

## 2023-10-19 DIAGNOSIS — F90.0 ATTENTION DEFICIT HYPERACTIVITY DISORDER (ADHD), PREDOMINANTLY INATTENTIVE TYPE: Chronic | ICD-10-CM

## 2023-10-19 PROCEDURE — 80307 DRUG TEST PRSMV CHEM ANLYZR: CPT

## 2023-10-26 LAB
AMPHETAMINES UR QL: POSITIVE
BARBITURATES UR QL SCN: NEGATIVE NG/ML
BENZODIAZ UR QL: POSITIVE NG/ML
BZE UR QL SCN: NEGATIVE NG/ML
CANNABINOIDS UR QL CFM: POSITIVE
CREAT UR-MCNC: 177.3 MG/DL (ref 20–300)
FENTANYL UR-MCNC: NEGATIVE PG/ML
LABORATORY COMMENT REPORT: ABNORMAL
MEPERIDINE UR QL: NEGATIVE NG/ML
METHADONE UR QL SCN: NEGATIVE NG/ML
OPIATES UR QL SCN: NEGATIVE NG/ML
OXYCODONE+OXYMORPHONE UR QL SCN: NEGATIVE NG/ML
PCP UR QL: NEGATIVE NG/ML
PH UR: 5.6 [PH] (ref 4.5–8.9)
PROPOXYPH UR QL SCN: NEGATIVE NG/ML
SP GR UR: 1.01
TRAMADOL UR QL SCN: NEGATIVE NG/ML

## 2023-11-08 DIAGNOSIS — F41.1 GAD (GENERALIZED ANXIETY DISORDER): Chronic | ICD-10-CM

## 2023-11-08 DIAGNOSIS — F90.0 ATTENTION DEFICIT HYPERACTIVITY DISORDER (ADHD), PREDOMINANTLY INATTENTIVE TYPE: ICD-10-CM

## 2023-11-09 RX ORDER — DEXTROAMPHETAMINE SACCHARATE, AMPHETAMINE ASPARTATE, DEXTROAMPHETAMINE SULFATE AND AMPHETAMINE SULFATE 5; 5; 5; 5 MG/1; MG/1; MG/1; MG/1
20 TABLET ORAL 3 TIMES DAILY
Qty: 90 TABLET | Refills: 0 | Status: SHIPPED | OUTPATIENT
Start: 2023-11-09 | End: 2024-11-08

## 2023-11-09 RX ORDER — CLONAZEPAM 1 MG/1
1 TABLET ORAL 3 TIMES DAILY PRN
Qty: 90 TABLET | Refills: 2 | OUTPATIENT
Start: 2023-11-09

## 2023-12-06 DIAGNOSIS — F90.0 ATTENTION DEFICIT HYPERACTIVITY DISORDER (ADHD), PREDOMINANTLY INATTENTIVE TYPE: ICD-10-CM

## 2023-12-06 RX ORDER — DEXTROAMPHETAMINE SACCHARATE, AMPHETAMINE ASPARTATE, DEXTROAMPHETAMINE SULFATE AND AMPHETAMINE SULFATE 5; 5; 5; 5 MG/1; MG/1; MG/1; MG/1
20 TABLET ORAL 3 TIMES DAILY
Qty: 90 TABLET | Refills: 0 | Status: SHIPPED | OUTPATIENT
Start: 2023-12-06 | End: 2024-12-05

## 2024-01-02 DIAGNOSIS — F90.0 ATTENTION DEFICIT HYPERACTIVITY DISORDER (ADHD), PREDOMINANTLY INATTENTIVE TYPE: ICD-10-CM

## 2024-01-02 DIAGNOSIS — F41.1 GAD (GENERALIZED ANXIETY DISORDER): Chronic | ICD-10-CM

## 2024-01-03 RX ORDER — CLONAZEPAM 1 MG/1
1 TABLET ORAL 3 TIMES DAILY PRN
Qty: 90 TABLET | Refills: 2 | Status: SHIPPED | OUTPATIENT
Start: 2024-01-03

## 2024-01-03 RX ORDER — DEXTROAMPHETAMINE SACCHARATE, AMPHETAMINE ASPARTATE, DEXTROAMPHETAMINE SULFATE AND AMPHETAMINE SULFATE 5; 5; 5; 5 MG/1; MG/1; MG/1; MG/1
20 TABLET ORAL 3 TIMES DAILY
Qty: 90 TABLET | Refills: 0 | OUTPATIENT
Start: 2024-01-03 | End: 2025-01-02

## 2024-01-03 RX ORDER — DEXTROAMPHETAMINE SACCHARATE, AMPHETAMINE ASPARTATE, DEXTROAMPHETAMINE SULFATE AND AMPHETAMINE SULFATE 5; 5; 5; 5 MG/1; MG/1; MG/1; MG/1
20 TABLET ORAL 3 TIMES DAILY
Qty: 90 TABLET | Refills: 0 | Status: SHIPPED | OUTPATIENT
Start: 2024-01-03 | End: 2025-01-02

## 2024-01-03 RX ORDER — CLONAZEPAM 1 MG/1
1 TABLET ORAL 3 TIMES DAILY PRN
Qty: 90 TABLET | Refills: 2 | OUTPATIENT
Start: 2024-01-03

## 2024-01-18 ENCOUNTER — TELEMEDICINE (OUTPATIENT)
Dept: PSYCHIATRY | Facility: CLINIC | Age: 34
End: 2024-01-18
Payer: MEDICAID

## 2024-01-18 DIAGNOSIS — F90.0 ATTENTION DEFICIT HYPERACTIVITY DISORDER (ADHD), PREDOMINANTLY INATTENTIVE TYPE: Primary | Chronic | ICD-10-CM

## 2024-01-18 DIAGNOSIS — F41.1 GAD (GENERALIZED ANXIETY DISORDER): Chronic | ICD-10-CM

## 2024-01-18 DIAGNOSIS — F31.60 BIPOLAR AFFECTIVE DISORDER, MIXED: Chronic | ICD-10-CM

## 2024-01-18 NOTE — PROGRESS NOTES
Subjective   Magan Valenzuela is a 33 y.o. male who presents today for follow up    This provider is located in [place], [place] using a secure MyChart Video Visit through [employer]. Patient is being seen remotely via telehealth at their home address in [place], and stated they are in a secure environment for this session. The patient's condition being diagnosed/treated is appropriate for telemedicine. The provider identified herself as well as her credentials.   The patient, and/or patients guardian, consent to be seen remotely, and when consent is given they understand that the consent allows for patient identifiable information to be sent to a third party as needed.   They may refuse to be seen remotely at any time. The electronic data is encrypted and password protected, and the patient and/or guardian has been advised of the potential risks to privacy not withstanding such measures.   PT Identifiers used: Name and .    You have chosen to receive care through a telehealth visit.  Do you consent to use a video/audio connection for your medical care today? Yes    Chief Complaint:  Depression, anxiety, ADHD   Things are going well, no need for med changes.     History of Present Illness:   He is back on track and doing well, working at [employer].  He had two weeks off for work for the holidays.   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  He is working two jobs now, also at [employer] 8 am to 8 pm most days and the Adderall IR is working much better and helping him get through his work days now.    Kids are doing well, kids staying with his Mom when he works.    Moved in 2022 to a Two BR apartment in [place] off [address]  He has 3 kids, 1 yr, 2 yr and a 15 yr (adopted)  His ex has the 1 yr old and makes it difficult to see her.   He was seeing Lesa Dee NP at [facility] but then involved in a CPS case with one of his kids and they had him  seeing Olimpia Espitia at [facility], who cut off all prescriptions and discharged him when urine drug screen negative for Klonopin  [facility], saw therapist (Alex) twice a week  Dx with ADHD in the second grade and was started on Adderall per Mom  Started on Xanax in his late teens when living in [place] but did not like it all, dx with Bipolar disorder  No relationship with his father  Depression 0/10  Denies SI/HI  Anxiety 3/10,  Over thinks things  More attentive and able to complete tasks with the Adderall but with the shortage, he is having issues getting the XR right now.       The following portions of the patient's history were reviewed and updated as appropriate: allergies, current medications, past family history, past medical history, past social history, past surgical history and problem list.    PAST PSYCHIATRIC HISTORY  Axis I  Affective/Bipoloar Disorder, Anxiety/Panic Disorder, Attention Deficit Disorder  Axis II  None    PAST OUTPATIENT TREATMENT  Diagnosis treated:  Affective Disorder, Anxiety/Panic Disorder, ADD  Treatment Type:  Individual Therapy, Medication Management  Genesight testing done December 2021, normal converter of folic acid  Prior Psychiatric Medications:  Xanax  Adderall XR  Adderall IR  Klonopin  Vraylar, helpful  Sertraline  Lamictal  Prozac  Support Groups:  None  Sequelae Of Mental Disorder:  job disruption, social isolation, family disruption, emotional distress      Interval History  Stable    Side Effects  None    Past psychiatric history was reviewed and compared to 10/11/23 visit and no updates were needed.     Past Medical History:  Past Medical History:   Diagnosis Date    ADHD (attention deficit hyperactivity disorder)     Anxiety     Bipolar disorder        Social History:  Social History     Socioeconomic History    Marital status: Single   Tobacco Use    Smoking status: Some Days    Smokeless tobacco: Never   Substance and Sexual Activity    Alcohol use: Not Currently    Drug  use: Yes     Types: Marijuana    Sexual activity: Defer       Family History:  Family History   Problem Relation Age of Onset    Depression Mother     Anxiety disorder Mother        Past Surgical History:  History reviewed. No pertinent surgical history.    Problem List:  Patient Active Problem List   Diagnosis    ADHD (attention deficit hyperactivity disorder)    FLOR (generalized anxiety disorder)    Bipolar affective disorder, mixed       Allergy:   Allergies   Allergen Reactions    Penicillins Anaphylaxis    Rocephin [Ceftriaxone] Anaphylaxis        Discontinued Medications:  Medications Discontinued During This Encounter   Medication Reason    Cariprazine HCl (Vraylar) 1.5 MG capsule capsule Reorder           Current Medications:   Current Outpatient Medications   Medication Sig Dispense Refill    Cariprazine HCl (Vraylar) 1.5 MG capsule capsule Take 1 capsule by mouth Daily. 90 capsule 1    amphetamine-dextroamphetamine (Adderall) 20 MG tablet Take 1 tablet by mouth 3 (Three) Times a Day. 90 tablet 0    clonazePAM (KlonoPIN) 1 MG tablet Take 1 tablet by mouth 3 (Three) Times a Day As Needed for Anxiety. 90 tablet 2    lamoTRIgine (LaMICtal) 100 MG tablet TAKE 1 TABLET BY MOUTH 2 (TWO) TIMES A DAY. FOR MOOD STABILIZER 180 tablet 1     No current facility-administered medications for this visit.         Psychological ROS: positive for - anxiety, concentration difficulties, depression, irritability, memory difficulties, mood swings and sleep disturbances  negative for - behavioral disorder, decreased libido, disorientation, hallucinations, hostility, obsessive thoughts, physical abuse, sexual abuse or suicidal ideation      Physical Exam:   There were no vitals taken for this visit.    Mental Status Exam:   Hygiene:   good  Cooperation:  Cooperative  Eye Contact:  Good  Psychomotor Behavior:  Appropriate  Affect: Appropriate  Mood: Normal   Hopelessness: Denies  Speech:  Normal  Thought Process:  Goal  directed  Thought Content:  Normal  Suicidal:  None  Homicidal:  None  Hallucinations:  None  Delusion:  None  Memory:  Intact  Orientation:  Person, Place, Time and Situation  Reliability:  good  Insight:  Good  Judgement:  Good  Impulse Control:  Good  Physical/Medical Issues:  No      Mental status exam was reviewed and compared to 10/11/23  visit and no updates were needed, the exam was the same.      PHQ-9 Depression Screening  Little interest or pleasure in doing things? 0-->not at all   Feeling down, depressed, or hopeless? 0-->not at all   Trouble falling or staying asleep, or sleeping too much?     Feeling tired or having little energy?     Poor appetite or overeating?     Feeling bad about yourself - or that you are a failure or have let yourself or your family down?     Trouble concentrating on things, such as reading the newspaper or watching television?     Moving or speaking so slowly that other people could have noticed? Or the opposite - being so fidgety or restless that you have been moving around a lot more than usual?     Thoughts that you would be better off dead, or of hurting yourself in some way?     PHQ-9 Total Score 0   If you checked off any problems, how difficult have these problems made it for you to do your work, take care of things at home, or get along with other people?             Current some day smoker less than 3 minutes spent counseling Has reduced Tobbacos use    I advised Magan of the risks of tobacco use.     Lab Results:   Lab on 10/19/2023   Component Date Value Ref Range Status    Amphetamine, Urine Qual 10/19/2023 Positive (A)  Abyydy=2652 Final    Amphetamine                 Positive        A                         01  Amphetamine Conf, MS, UR    >3000              ng/mL Mmvsph=827       01  Amphetamine detected; this finding can be consistent with use of  medications that include Adderall, Benzedrine, Dexadrine, Vyvanse, or  generic formulations. This drug may also  be detected from use of  prescriptions that contain or metabolize to Methamphetamine, or from  use of illicit Methamphetamine. Drugs listed are representative of  common sources of the compound detected and are not intended to  include all possible sources.  Methamphetamine             Negative            Sbxbjc=442            01    Barbiturates Screen, Urine 10/19/2023 Negative  Cqnelk=833 ng/mL Final    Benzodiazepine Screen, Urine 10/19/2023 Positive (A)  Oihfpx=336 ng/mL Final    Comment:   Nordiazepam               Negative            Zcocqk=505            01    Oxazepam                  Negative            Zudowi=287            01    Flurazepam                Negative            Jzdwkg=040            01    Lorazepam                 Negative            Qvxfgj=790            01    Alprazolam                Negative            Bvsefz=481            01    Clonazepam                Positive        A                         01  Clonazepam Conf, MS, UR     914                ng/mL Txfgvx=326       01  Clonazepam detected; this finding is consistent with use of  medications that include Klonopin, Rivotril, or generic formulations.  Drugs listed are representative of common sources of the compound  detected and are not intended to include all possible sources.    Temazepam                 Negative            Lfxqav=518            01    Triazolam                 Negative            Rjnxnd=086            01    Midazolam                 Negative            Qnowff=984                                       01    THC Screen, Urine 10/19/2023 Positive (A)  Cutoff=20 Final    Carboxy THC Conf, MS, UR    474                ng/mL Cutoff=10        01    Cocaine Screen, Urine 10/19/2023 Negative  Dofyhe=653 ng/mL Final    Opiate Screen, Urine 10/19/2023 Negative  Rgghis=961 ng/mL Final    Opiate test includes Codeine, Morphine, Hydromorphone, Hydrocodone.    Oxycodone/Oxymorphone, Urine 10/19/2023 Negative  Jsvtyr=514 ng/mL Final     Test includes Oxycodone and Oxymorphone    Phencyclidine (PCP), Urine 10/19/2023 Negative  Cutoff=25 ng/mL Final    Methadone Screen, Urine 10/19/2023 Negative  Hrwhyg=145 ng/mL Final    Propoxyphene Screen 10/19/2023 Negative  Bkhuft=004 ng/mL Final    Meperidine, Urine 10/19/2023 Negative  Kwehol=624 ng/mL Final    This test was developed and its performance characteristics  determined by [facility]. It has not been cleared or approved  by the Food and Drug Administration.    Fentanyl, Urine 10/19/2023 Negative  Bwrmle=2845 pg/mL Final    Test includes Fentanyl and Norfentanyl  This test was developed and its performance characteristics  determined by [facility]. It has not been cleared or approved  by the Food and Drug Administration.    Tramadol Screen, Urine 10/19/2023 Negative  Nlcqou=516 ng/mL Final    Creatinine, Urine 10/19/2023 177.3  20.0 - 300.0 mg/dL Final    Specific Gravity, UA 10/19/2023 1.010   Final    pH, UA 10/19/2023 5.6  4.5 - 8.9 Final    Please note 10/19/2023 Comment   Final    Drug-test results should be interpreted in the context of clinical  information. Patient metabolic variables, specific drug chemistry, and  specimen characteristics can affect test outcome. Technical  consultation is available if a test result is inconsistent with an  expected outcome. (email-[email or web address] or call toll-free  [phone])  Drug brands, if listed herein, are trademarks of their respective  owners.       Assessment & Plan   Problems Addressed this Visit          Mental Health    ADHD (attention deficit hyperactivity disorder) - Primary (Chronic)    Relevant Medications    Cariprazine HCl (Vraylar) 1.5 MG capsule capsule    FLOR (generalized anxiety disorder) (Chronic)    Relevant Medications    Cariprazine HCl (Vraylar) 1.5 MG capsule capsule    Bipolar affective disorder, mixed (Chronic)    Relevant Medications    Cariprazine HCl (Vraylar) 1.5 MG capsule capsule     Diagnoses         Codes  Comments    Attention deficit hyperactivity disorder (ADHD), predominantly inattentive type    -  Primary ICD-10-CM: F90.0  ICD-9-CM: 314.00     Bipolar affective disorder, mixed     ICD-10-CM: F31.60  ICD-9-CM: 296.60     FLOR (generalized anxiety disorder)     ICD-10-CM: F41.1  ICD-9-CM: 300.02             Visit Diagnoses:    ICD-10-CM ICD-9-CM   1. Attention deficit hyperactivity disorder (ADHD), predominantly inattentive type  F90.0 314.00   2. Bipolar affective disorder, mixed  F31.60 296.60   3. FLOR (generalized anxiety disorder)  F41.1 300.02             TREATMENT PLAN/GOALS: Continue supportive psychotherapy efforts and medications as indicated. Treatment and medication options discussed during today's visit. Patient ackowledged and verbally consented to continue with current treatment plan and was educated on the importance of compliance with treatment and follow-up appointments.    MEDICATION ISSUES:  INSPECT reviewed as expected  Discussed medication options and treatment plan of prescribed medication as well as the risks, benefits, and side effects including potential falls, possible impaired driving and metabolic adversities among others. Patient is agreeable to call the office with any worsening of symptoms or onset of side effects. Patient is agreeable to call 911 or go to the nearest ER should he/she begin having SI/HI. No medication side effects or related complaints today.     Patient with a long history of ADHD, bipolar disorder and anxiety.  Patient reports doing much better on his current medications.  Continue Vraylar 1.5 mg daily for bipolar, use as directed per [employer].  Continue Adderall IR 20 mg TID for ADHD since his work days are so long.    Continue Klonopin 1 mg tablet 3 times daily as needed for anxiety, encouraged him to start decreasing the frequency of dosing.  Continue Lamictal 100 mg twice daily for mood stabilizer.  Continue counseling  Urine drug screen done October 2023,  consistent.   He does smoke THC on the weekends when he does not have his kids.       MEDS ORDERED DURING VISIT:  New Medications Ordered This Visit   Medications    Cariprazine HCl (Vraylar) 1.5 MG capsule capsule     Sig: Take 1 capsule by mouth Daily.     Dispense:  90 capsule     Refill:  1       Return in about 3 months (around 4/18/2024) for video visit.          This document has been electronically signed by Thania Reyes PA-C  January 18, 2024 08:16 EST    Part of this note may be an electronic transcription/translation of spoken language to printed text using the Dragon Dictation System.

## 2024-02-07 DIAGNOSIS — F90.0 ATTENTION DEFICIT HYPERACTIVITY DISORDER (ADHD), PREDOMINANTLY INATTENTIVE TYPE: ICD-10-CM

## 2024-02-07 RX ORDER — DEXTROAMPHETAMINE SACCHARATE, AMPHETAMINE ASPARTATE, DEXTROAMPHETAMINE SULFATE AND AMPHETAMINE SULFATE 5; 5; 5; 5 MG/1; MG/1; MG/1; MG/1
20 TABLET ORAL 3 TIMES DAILY
Qty: 90 TABLET | Refills: 0 | Status: SHIPPED | OUTPATIENT
Start: 2024-02-07 | End: 2025-02-06

## 2024-03-04 DIAGNOSIS — F90.0 ATTENTION DEFICIT HYPERACTIVITY DISORDER (ADHD), PREDOMINANTLY INATTENTIVE TYPE: ICD-10-CM

## 2024-03-06 DIAGNOSIS — F90.0 ATTENTION DEFICIT HYPERACTIVITY DISORDER (ADHD), PREDOMINANTLY INATTENTIVE TYPE: ICD-10-CM

## 2024-03-07 RX ORDER — LAMOTRIGINE 100 MG/1
100 TABLET ORAL 2 TIMES DAILY
Qty: 60 TABLET | Refills: 5 | Status: SHIPPED | OUTPATIENT
Start: 2024-03-07 | End: 2024-09-03

## 2024-03-07 RX ORDER — DEXTROAMPHETAMINE SACCHARATE, AMPHETAMINE ASPARTATE, DEXTROAMPHETAMINE SULFATE AND AMPHETAMINE SULFATE 5; 5; 5; 5 MG/1; MG/1; MG/1; MG/1
20 TABLET ORAL 3 TIMES DAILY
Qty: 90 TABLET | Refills: 0 | OUTPATIENT
Start: 2024-03-07 | End: 2025-03-07

## 2024-03-07 RX ORDER — DEXTROAMPHETAMINE SACCHARATE, AMPHETAMINE ASPARTATE, DEXTROAMPHETAMINE SULFATE AND AMPHETAMINE SULFATE 5; 5; 5; 5 MG/1; MG/1; MG/1; MG/1
20 TABLET ORAL 3 TIMES DAILY
Qty: 90 TABLET | Refills: 0 | Status: SHIPPED | OUTPATIENT
Start: 2024-03-07 | End: 2025-03-07

## 2024-03-21 DIAGNOSIS — F41.1 GAD (GENERALIZED ANXIETY DISORDER): Chronic | ICD-10-CM

## 2024-03-22 RX ORDER — CLONAZEPAM 1 MG/1
1 TABLET ORAL 3 TIMES DAILY PRN
Qty: 90 TABLET | Refills: 2 | Status: SHIPPED | OUTPATIENT
Start: 2024-03-22

## 2024-04-05 DIAGNOSIS — F90.0 ATTENTION DEFICIT HYPERACTIVITY DISORDER (ADHD), PREDOMINANTLY INATTENTIVE TYPE: ICD-10-CM

## 2024-04-08 RX ORDER — DEXTROAMPHETAMINE SACCHARATE, AMPHETAMINE ASPARTATE, DEXTROAMPHETAMINE SULFATE AND AMPHETAMINE SULFATE 5; 5; 5; 5 MG/1; MG/1; MG/1; MG/1
20 TABLET ORAL 3 TIMES DAILY
Qty: 90 TABLET | Refills: 0 | Status: SHIPPED | OUTPATIENT
Start: 2024-04-08 | End: 2025-04-08

## 2024-04-18 ENCOUNTER — TELEMEDICINE (OUTPATIENT)
Dept: PSYCHIATRY | Facility: CLINIC | Age: 34
End: 2024-04-18
Payer: MEDICAID

## 2024-04-18 ENCOUNTER — PATIENT MESSAGE (OUTPATIENT)
Dept: PSYCHIATRY | Facility: CLINIC | Age: 34
End: 2024-04-18

## 2024-04-18 DIAGNOSIS — F41.1 GAD (GENERALIZED ANXIETY DISORDER): Chronic | ICD-10-CM

## 2024-04-18 DIAGNOSIS — F31.60 BIPOLAR AFFECTIVE DISORDER, MIXED: Primary | Chronic | ICD-10-CM

## 2024-04-18 DIAGNOSIS — F90.0 ATTENTION DEFICIT HYPERACTIVITY DISORDER (ADHD), PREDOMINANTLY INATTENTIVE TYPE: Chronic | ICD-10-CM
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
## 2024-05-07 DIAGNOSIS — F90.0 ATTENTION DEFICIT HYPERACTIVITY DISORDER (ADHD), PREDOMINANTLY INATTENTIVE TYPE: ICD-10-CM

## 2024-05-07 RX ORDER — DEXTROAMPHETAMINE SACCHARATE, AMPHETAMINE ASPARTATE, DEXTROAMPHETAMINE SULFATE AND AMPHETAMINE SULFATE 5; 5; 5; 5 MG/1; MG/1; MG/1; MG/1
20 TABLET ORAL 3 TIMES DAILY
Qty: 90 TABLET | Refills: 0 | Status: SHIPPED | OUTPATIENT
Start: 2024-05-07 | End: 2025-05-07

## 2024-06-05 DIAGNOSIS — F90.0 ATTENTION DEFICIT HYPERACTIVITY DISORDER (ADHD), PREDOMINANTLY INATTENTIVE TYPE: ICD-10-CM

## 2024-06-05 RX ORDER — DEXTROAMPHETAMINE SACCHARATE, AMPHETAMINE ASPARTATE, DEXTROAMPHETAMINE SULFATE AND AMPHETAMINE SULFATE 5; 5; 5; 5 MG/1; MG/1; MG/1; MG/1
20 TABLET ORAL 3 TIMES DAILY
Qty: 90 TABLET | Refills: 0 | Status: SHIPPED | OUTPATIENT
Start: 2024-06-05 | End: 2025-06-05

## 2024-06-05 NOTE — TELEPHONE ENCOUNTER
Pt request refill   [FreeTextEntry1] : cont PT\par form for transportation completed\par f/u w/ specialists\par obtain medical records from [facility]\par rtc for cpe

## 2024-06-19 DIAGNOSIS — F41.1 GAD (GENERALIZED ANXIETY DISORDER): Chronic | ICD-10-CM

## 2024-06-20 RX ORDER — CLONAZEPAM 1 MG/1
1 TABLET ORAL 3 TIMES DAILY PRN
Qty: 90 TABLET | Refills: 2 | Status: SHIPPED | OUTPATIENT
Start: 2024-06-20

## 2024-06-30 DIAGNOSIS — F90.0 ATTENTION DEFICIT HYPERACTIVITY DISORDER (ADHD), PREDOMINANTLY INATTENTIVE TYPE: ICD-10-CM

## 2024-07-01 RX ORDER — DEXTROAMPHETAMINE SACCHARATE, AMPHETAMINE ASPARTATE, DEXTROAMPHETAMINE SULFATE AND AMPHETAMINE SULFATE 5; 5; 5; 5 MG/1; MG/1; MG/1; MG/1
20 TABLET ORAL 3 TIMES DAILY
Qty: 90 TABLET | Refills: 0 | Status: SHIPPED | OUTPATIENT
Start: 2024-07-01 | End: 2025-07-01

## 2024-07-26 DIAGNOSIS — F90.0 ATTENTION DEFICIT HYPERACTIVITY DISORDER (ADHD), PREDOMINANTLY INATTENTIVE TYPE: ICD-10-CM

## 2024-07-27 RX ORDER — DEXTROAMPHETAMINE SACCHARATE, AMPHETAMINE ASPARTATE, DEXTROAMPHETAMINE SULFATE AND AMPHETAMINE SULFATE 5; 5; 5; 5 MG/1; MG/1; MG/1; MG/1
20 TABLET ORAL 3 TIMES DAILY
Qty: 90 TABLET | Refills: 0 | Status: SHIPPED | OUTPATIENT
Start: 2024-07-27 | End: 2025-07-27

## 2024-07-27 RX ORDER — DEXTROAMPHETAMINE SACCHARATE, AMPHETAMINE ASPARTATE, DEXTROAMPHETAMINE SULFATE AND AMPHETAMINE SULFATE 5; 5; 5; 5 MG/1; MG/1; MG/1; MG/1
20 TABLET ORAL 3 TIMES DAILY
Qty: 90 TABLET | Refills: 0 | OUTPATIENT
Start: 2024-07-27 | End: 2025-07-27

## 2024-08-06 ENCOUNTER — TELEMEDICINE (OUTPATIENT)
Dept: PSYCHIATRY | Facility: CLINIC | Age: 34
End: 2024-08-06
Payer: MEDICAID

## 2024-08-06 DIAGNOSIS — F90.0 ATTENTION DEFICIT HYPERACTIVITY DISORDER (ADHD), PREDOMINANTLY INATTENTIVE TYPE: Chronic | ICD-10-CM

## 2024-08-06 DIAGNOSIS — F31.60 BIPOLAR AFFECTIVE DISORDER, MIXED: Primary | Chronic | ICD-10-CM

## 2024-08-06 DIAGNOSIS — F41.1 GAD (GENERALIZED ANXIETY DISORDER): Chronic | ICD-10-CM

## 2024-08-06 PROCEDURE — 1159F MED LIST DOCD IN RCRD: CPT | Performed by: PHYSICIAN ASSISTANT

## 2024-08-06 PROCEDURE — 1160F RVW MEDS BY RX/DR IN RCRD: CPT | Performed by: PHYSICIAN ASSISTANT

## 2024-08-06 PROCEDURE — 99214 OFFICE O/P EST MOD 30 MIN: CPT | Performed by: PHYSICIAN ASSISTANT

## 2024-08-06 RX ORDER — PROCHLORPERAZINE MALEATE 5 MG/1
5 TABLET ORAL EVERY 8 HOURS PRN
Qty: 90 TABLET | Refills: 1 | Status: SHIPPED | OUTPATIENT
Start: 2024-08-06
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
## 2024-08-23 DIAGNOSIS — F90.0 ATTENTION DEFICIT HYPERACTIVITY DISORDER (ADHD), PREDOMINANTLY INATTENTIVE TYPE: ICD-10-CM

## 2024-08-23 RX ORDER — DEXTROAMPHETAMINE SACCHARATE, AMPHETAMINE ASPARTATE, DEXTROAMPHETAMINE SULFATE AND AMPHETAMINE SULFATE 5; 5; 5; 5 MG/1; MG/1; MG/1; MG/1
20 TABLET ORAL 3 TIMES DAILY
Qty: 90 TABLET | Refills: 0 | Status: SHIPPED | OUTPATIENT
Start: 2024-08-23 | End: 2025-08-23

## 2024-08-30 RX ORDER — LAMOTRIGINE 100 MG/1
100 TABLET ORAL 2 TIMES DAILY
Qty: 180 TABLET | Refills: 1 | Status: SHIPPED | OUTPATIENT
Start: 2024-08-30 | End: 2025-02-26

## 2024-09-15 DIAGNOSIS — F41.1 GAD (GENERALIZED ANXIETY DISORDER): Chronic | ICD-10-CM

## 2024-09-16 ENCOUNTER — PRIOR AUTHORIZATION (OUTPATIENT)
Dept: PSYCHIATRY | Facility: CLINIC | Age: 34
End: 2024-09-16
Payer: MEDICAID

## 2024-09-16 RX ORDER — CLONAZEPAM 1 MG/1
1 TABLET ORAL 3 TIMES DAILY PRN
Qty: 90 TABLET | Refills: 0 | Status: SHIPPED | OUTPATIENT
Start: 2024-09-16

## 2024-09-22 DIAGNOSIS — F90.0 ATTENTION DEFICIT HYPERACTIVITY DISORDER (ADHD), PREDOMINANTLY INATTENTIVE TYPE: ICD-10-CM

## 2024-09-22 RX ORDER — DEXTROAMPHETAMINE SACCHARATE, AMPHETAMINE ASPARTATE, DEXTROAMPHETAMINE SULFATE AND AMPHETAMINE SULFATE 5; 5; 5; 5 MG/1; MG/1; MG/1; MG/1
20 TABLET ORAL 3 TIMES DAILY
Qty: 90 TABLET | Refills: 0 | Status: SHIPPED | OUTPATIENT
Start: 2024-09-22 | End: 2025-09-22

## 2024-10-14 DIAGNOSIS — F41.1 GAD (GENERALIZED ANXIETY DISORDER): Chronic | ICD-10-CM

## 2024-10-14 DIAGNOSIS — F90.0 ATTENTION DEFICIT HYPERACTIVITY DISORDER (ADHD), PREDOMINANTLY INATTENTIVE TYPE: ICD-10-CM

## 2024-10-15 RX ORDER — CLONAZEPAM 1 MG/1
1 TABLET ORAL 3 TIMES DAILY PRN
Qty: 90 TABLET | Refills: 0 | Status: SHIPPED | OUTPATIENT
Start: 2024-10-15

## 2024-10-15 RX ORDER — DEXTROAMPHETAMINE SACCHARATE, AMPHETAMINE ASPARTATE, DEXTROAMPHETAMINE SULFATE AND AMPHETAMINE SULFATE 5; 5; 5; 5 MG/1; MG/1; MG/1; MG/1
20 TABLET ORAL 3 TIMES DAILY
Qty: 90 TABLET | Refills: 0 | Status: SHIPPED | OUTPATIENT
Start: 2024-10-15 | End: 2025-10-15

## 2024-11-11 DIAGNOSIS — F41.1 GAD (GENERALIZED ANXIETY DISORDER): Chronic | ICD-10-CM

## 2024-11-11 DIAGNOSIS — F90.0 ATTENTION DEFICIT HYPERACTIVITY DISORDER (ADHD), PREDOMINANTLY INATTENTIVE TYPE: ICD-10-CM

## 2024-11-11 RX ORDER — DEXTROAMPHETAMINE SACCHARATE, AMPHETAMINE ASPARTATE, DEXTROAMPHETAMINE SULFATE AND AMPHETAMINE SULFATE 5; 5; 5; 5 MG/1; MG/1; MG/1; MG/1
20 TABLET ORAL 3 TIMES DAILY
Qty: 90 TABLET | Refills: 0 | Status: SHIPPED | OUTPATIENT
Start: 2024-11-11 | End: 2025-11-11

## 2024-11-11 RX ORDER — CLONAZEPAM 1 MG/1
1 TABLET ORAL 3 TIMES DAILY PRN
Qty: 90 TABLET | Refills: 0 | Status: SHIPPED | OUTPATIENT
Start: 2024-11-11

## 2024-12-07 DIAGNOSIS — F90.0 ATTENTION DEFICIT HYPERACTIVITY DISORDER (ADHD), PREDOMINANTLY INATTENTIVE TYPE: ICD-10-CM

## 2024-12-07 DIAGNOSIS — F41.1 GAD (GENERALIZED ANXIETY DISORDER): Chronic | ICD-10-CM

## 2024-12-09 RX ORDER — DEXTROAMPHETAMINE SACCHARATE, AMPHETAMINE ASPARTATE, DEXTROAMPHETAMINE SULFATE AND AMPHETAMINE SULFATE 5; 5; 5; 5 MG/1; MG/1; MG/1; MG/1
20 TABLET ORAL 3 TIMES DAILY
Qty: 90 TABLET | Refills: 0 | Status: SHIPPED | OUTPATIENT
Start: 2024-12-09 | End: 2025-12-09

## 2024-12-09 RX ORDER — CLONAZEPAM 1 MG/1
1 TABLET ORAL 3 TIMES DAILY PRN
Qty: 90 TABLET | Refills: 0 | Status: SHIPPED | OUTPATIENT
Start: 2024-12-09

## 2024-12-09 NOTE — TELEPHONE ENCOUNTER
Tried to call the pt didn't get an answer could not leave a vm. Sent pt a [employer] message to make them aware of the refill being sent in and to call the office to make an appt.

## 2024-12-09 NOTE — TELEPHONE ENCOUNTER
Tried to call the pt didn't get an answer could not leave a vm. Sent pt a [employer] message to make them aware of the refill being sent in and to call the office to make an appt.

## 2025-01-02 ENCOUNTER — TELEPHONE (OUTPATIENT)
Dept: PSYCHIATRY | Facility: CLINIC | Age: 35
End: 2025-01-02
Payer: MEDICAID

## 2025-01-02 NOTE — TELEPHONE ENCOUNTER
Patient's mother called the office this after noon stating she feels like the patient is doing things out of the ordinary for him. Patients mom stated she is very worried that patient will end up in assisted. The mother stated the patient is scamming people and company's out of money. She stated that the patient turned claim into [employer] of $800 and stated that was not a charge of his, mother stated it was and [employer] gave him the money back and then patient ended up doing it again. Patient's mother stated he is having manic episodes. I explained I would sent a note to you to make you aware of all of this. Patient is scheduled 01/27/25.        no loss of consciousness/no vomiting

## 2025-01-15 DIAGNOSIS — F90.0 ATTENTION DEFICIT HYPERACTIVITY DISORDER (ADHD), PREDOMINANTLY INATTENTIVE TYPE: ICD-10-CM

## 2025-01-15 DIAGNOSIS — F41.1 GAD (GENERALIZED ANXIETY DISORDER): Chronic | ICD-10-CM

## 2025-01-15 RX ORDER — CLONAZEPAM 1 MG/1
1 TABLET ORAL 3 TIMES DAILY PRN
Qty: 90 TABLET | Refills: 0 | Status: SHIPPED | OUTPATIENT
Start: 2025-01-15

## 2025-01-15 RX ORDER — DEXTROAMPHETAMINE SACCHARATE, AMPHETAMINE ASPARTATE, DEXTROAMPHETAMINE SULFATE AND AMPHETAMINE SULFATE 5; 5; 5; 5 MG/1; MG/1; MG/1; MG/1
20 TABLET ORAL 3 TIMES DAILY
Qty: 90 TABLET | Refills: 0 | Status: SHIPPED | OUTPATIENT
Start: 2025-01-15 | End: 2026-01-15

## 2025-02-05 ENCOUNTER — TELEPHONE (OUTPATIENT)
Dept: PSYCHIATRY | Facility: CLINIC | Age: 35
End: 2025-02-05
Payer: MEDICAID

## 2025-02-05 NOTE — TELEPHONE ENCOUNTER
"Patient called tearful, He has lost his kids until he can \"straighten up\"     He wonders if you can help him by providing Parenting classes of any kind and drug testing?        Please Advise?           Patient provide two phone numbers if you wish to speak with him.   [phone]        Thank You  "

## 2025-02-09 DIAGNOSIS — F41.1 GAD (GENERALIZED ANXIETY DISORDER): Chronic | ICD-10-CM

## 2025-02-09 DIAGNOSIS — F90.0 ATTENTION DEFICIT HYPERACTIVITY DISORDER (ADHD), PREDOMINANTLY INATTENTIVE TYPE: ICD-10-CM

## 2025-02-10 RX ORDER — DEXTROAMPHETAMINE SACCHARATE, AMPHETAMINE ASPARTATE, DEXTROAMPHETAMINE SULFATE AND AMPHETAMINE SULFATE 5; 5; 5; 5 MG/1; MG/1; MG/1; MG/1
20 TABLET ORAL 3 TIMES DAILY
Qty: 90 TABLET | Refills: 0 | Status: SHIPPED | OUTPATIENT
Start: 2025-02-10 | End: 2026-02-10

## 2025-02-10 RX ORDER — CLONAZEPAM 1 MG/1
1 TABLET ORAL 3 TIMES DAILY PRN
Qty: 90 TABLET | Refills: 0 | Status: SHIPPED | OUTPATIENT
Start: 2025-02-10

## 2025-02-12 NOTE — TELEPHONE ENCOUNTER
I have sent this information from Thania Reyes to patient via My Chart messages and I called the patient to let him know that it is there.      Thank You

## 2025-02-27 ENCOUNTER — TELEPHONE (OUTPATIENT)
Dept: PSYCHIATRY | Facility: CLINIC | Age: 35
End: 2025-02-27
Payer: MEDICAID
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
## 2025-03-12 DIAGNOSIS — F90.0 ATTENTION DEFICIT HYPERACTIVITY DISORDER (ADHD), PREDOMINANTLY INATTENTIVE TYPE: ICD-10-CM

## 2025-03-12 DIAGNOSIS — F41.1 GAD (GENERALIZED ANXIETY DISORDER): Chronic | ICD-10-CM

## 2025-03-12 NOTE — TELEPHONE ENCOUNTER
Patient is unable to do visits at this time because he doesn't have access to a smart device.  Patient states he only has a flip phone.  Patient states he will go purchase a smart phone and call to reschedule his appointment.  Patient needs refills. Please advise.

## 2025-03-13 RX ORDER — CLONAZEPAM 1 MG/1
1 TABLET ORAL 3 TIMES DAILY PRN
Qty: 90 TABLET | Refills: 0 | Status: SHIPPED | OUTPATIENT
Start: 2025-03-13

## 2025-03-13 RX ORDER — LAMOTRIGINE 100 MG/1
100 TABLET ORAL 2 TIMES DAILY
Qty: 180 TABLET | Refills: 1 | Status: SHIPPED | OUTPATIENT
Start: 2025-03-13 | End: 2025-09-09

## 2025-03-13 RX ORDER — DEXTROAMPHETAMINE SACCHARATE, AMPHETAMINE ASPARTATE, DEXTROAMPHETAMINE SULFATE AND AMPHETAMINE SULFATE 5; 5; 5; 5 MG/1; MG/1; MG/1; MG/1
20 TABLET ORAL 3 TIMES DAILY
Qty: 90 TABLET | Refills: 0 | Status: SHIPPED | OUTPATIENT
Start: 2025-03-13 | End: 2026-03-13

## 2025-04-14 DIAGNOSIS — F41.1 GAD (GENERALIZED ANXIETY DISORDER): Chronic | ICD-10-CM

## 2025-04-14 DIAGNOSIS — F90.0 ATTENTION DEFICIT HYPERACTIVITY DISORDER (ADHD), PREDOMINANTLY INATTENTIVE TYPE: ICD-10-CM

## 2025-04-14 NOTE — TELEPHONE ENCOUNTER
Patient called to reschedule appointment. Patient requests refills to get him to his next appointment. Please advise.

## 2025-04-15 RX ORDER — DEXTROAMPHETAMINE SACCHARATE, AMPHETAMINE ASPARTATE, DEXTROAMPHETAMINE SULFATE AND AMPHETAMINE SULFATE 5; 5; 5; 5 MG/1; MG/1; MG/1; MG/1
20 TABLET ORAL 3 TIMES DAILY
Qty: 90 TABLET | Refills: 0 | Status: SHIPPED | OUTPATIENT
Start: 2025-04-15 | End: 2026-04-15

## 2025-04-15 RX ORDER — CLONAZEPAM 1 MG/1
1 TABLET ORAL 3 TIMES DAILY PRN
Qty: 90 TABLET | Refills: 0 | Status: SHIPPED | OUTPATIENT
Start: 2025-04-15

## 2025-05-09 DIAGNOSIS — F41.1 GAD (GENERALIZED ANXIETY DISORDER): Chronic | ICD-10-CM

## 2025-05-09 DIAGNOSIS — F90.0 ATTENTION DEFICIT HYPERACTIVITY DISORDER (ADHD), PREDOMINANTLY INATTENTIVE TYPE: ICD-10-CM

## 2025-05-12 DIAGNOSIS — F90.0 ATTENTION DEFICIT HYPERACTIVITY DISORDER (ADHD), PREDOMINANTLY INATTENTIVE TYPE: ICD-10-CM

## 2025-05-12 RX ORDER — PROCHLORPERAZINE MALEATE 5 MG/1
5 TABLET ORAL EVERY 8 HOURS PRN
Qty: 90 TABLET | Refills: 1 | Status: CANCELLED | OUTPATIENT
Start: 2025-05-12

## 2025-05-12 RX ORDER — PROCHLORPERAZINE MALEATE 5 MG/1
5 TABLET ORAL EVERY 8 HOURS PRN
Qty: 90 TABLET | Refills: 1 | Status: SHIPPED | OUTPATIENT
Start: 2025-05-12

## 2025-05-12 RX ORDER — DEXTROAMPHETAMINE SACCHARATE, AMPHETAMINE ASPARTATE, DEXTROAMPHETAMINE SULFATE AND AMPHETAMINE SULFATE 5; 5; 5; 5 MG/1; MG/1; MG/1; MG/1
20 TABLET ORAL 3 TIMES DAILY
Qty: 90 TABLET | Refills: 0 | OUTPATIENT
Start: 2025-05-12 | End: 2026-05-12

## 2025-05-12 RX ORDER — DEXTROAMPHETAMINE SACCHARATE, AMPHETAMINE ASPARTATE, DEXTROAMPHETAMINE SULFATE AND AMPHETAMINE SULFATE 5; 5; 5; 5 MG/1; MG/1; MG/1; MG/1
20 TABLET ORAL 3 TIMES DAILY
Qty: 90 TABLET | Refills: 0 | Status: CANCELLED | OUTPATIENT
Start: 2025-05-12 | End: 2026-05-12

## 2025-05-12 RX ORDER — LAMOTRIGINE 100 MG/1
100 TABLET ORAL 2 TIMES DAILY
Qty: 180 TABLET | Refills: 1 | Status: CANCELLED | OUTPATIENT
Start: 2025-05-12 | End: 2025-11-08

## 2025-05-12 RX ORDER — CLONAZEPAM 1 MG/1
1 TABLET ORAL 3 TIMES DAILY PRN
Qty: 90 TABLET | Refills: 0 | Status: SHIPPED | OUTPATIENT
Start: 2025-05-12

## 2025-05-12 RX ORDER — LAMOTRIGINE 100 MG/1
100 TABLET ORAL 2 TIMES DAILY
Qty: 180 TABLET | Refills: 1 | OUTPATIENT
Start: 2025-05-12 | End: 2025-11-08

## 2025-05-22 ENCOUNTER — TELEMEDICINE (OUTPATIENT)
Dept: PSYCHIATRY | Facility: CLINIC | Age: 35
End: 2025-05-22
Payer: MEDICAID

## 2025-05-22 DIAGNOSIS — F31.60 BIPOLAR AFFECTIVE DISORDER, MIXED: ICD-10-CM

## 2025-05-22 DIAGNOSIS — F90.0 ATTENTION DEFICIT HYPERACTIVITY DISORDER (ADHD), PREDOMINANTLY INATTENTIVE TYPE: Primary | ICD-10-CM

## 2025-05-22 DIAGNOSIS — F90.0 ATTENTION DEFICIT HYPERACTIVITY DISORDER (ADHD), PREDOMINANTLY INATTENTIVE TYPE: ICD-10-CM

## 2025-05-22 DIAGNOSIS — F41.1 GAD (GENERALIZED ANXIETY DISORDER): ICD-10-CM

## 2025-05-22 RX ORDER — DEXTROAMPHETAMINE SACCHARATE, AMPHETAMINE ASPARTATE, DEXTROAMPHETAMINE SULFATE AND AMPHETAMINE SULFATE 5; 5; 5; 5 MG/1; MG/1; MG/1; MG/1
20 TABLET ORAL 3 TIMES DAILY
Qty: 90 TABLET | Refills: 0 | Status: SHIPPED | OUTPATIENT
Start: 2025-05-22 | End: 2026-05-22
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
## 2025-06-03 ENCOUNTER — TELEPHONE (OUTPATIENT)
Dept: PSYCHIATRY | Facility: CLINIC | Age: 35
End: 2025-06-03

## 2025-06-03 NOTE — TELEPHONE ENCOUNTER
Pt mother phoned said pt was arrested this morning. Pt mother stated he has 2 open dcf cases and he will not complete the task to get those took care of. Pt mother states she sees something going on with Pt but doesn't know what it is. Pt mother stated she need speak to Therapist to see if something they can do.    Please advise.
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
## 2025-06-10 ENCOUNTER — TELEPHONE (OUTPATIENT)
Dept: PSYCHIATRY | Facility: CLINIC | Age: 35
End: 2025-06-10

## 2025-06-23 DIAGNOSIS — F90.0 ATTENTION DEFICIT HYPERACTIVITY DISORDER (ADHD), PREDOMINANTLY INATTENTIVE TYPE: ICD-10-CM

## 2025-06-23 DIAGNOSIS — F41.1 GAD (GENERALIZED ANXIETY DISORDER): Chronic | ICD-10-CM

## 2025-06-23 RX ORDER — CLONAZEPAM 1 MG/1
1 TABLET ORAL 3 TIMES DAILY PRN
Qty: 90 TABLET | Refills: 0 | Status: SHIPPED | OUTPATIENT
Start: 2025-06-23

## 2025-06-23 RX ORDER — DEXTROAMPHETAMINE SACCHARATE, AMPHETAMINE ASPARTATE, DEXTROAMPHETAMINE SULFATE AND AMPHETAMINE SULFATE 5; 5; 5; 5 MG/1; MG/1; MG/1; MG/1
20 TABLET ORAL 3 TIMES DAILY
Qty: 90 TABLET | Refills: 0 | Status: SHIPPED | OUTPATIENT
Start: 2025-06-23 | End: 2026-06-23

## 2025-06-23 NOTE — TELEPHONE ENCOUNTER
Rx refill  Pt called needed get msg to provider that her old location in [place] has a child ring and Pt stated his children was caught up in this and having a lot of issues dealing with.   Pt is scheduled 9/2/25 and placed on call list

## 2025-06-24 ENCOUNTER — TELEPHONE (OUTPATIENT)
Dept: PSYCHIATRY | Facility: CLINIC | Age: 35
End: 2025-06-24

## 2025-06-24 NOTE — TELEPHONE ENCOUNTER
Pharmacist named Awa from [facility] called and wanted to verify that the provider made the pt aware of the risk of the pt taking the Adderall and the Klonopin in combanation.  Pharmacist wanted to verify if the provider still wanted them to fill the medications today.  They have a new system and it is flagging these refills and they have to verify before they fill.  Pharmacist was made aware that the provider was out of office, but that the message would be sent to a covering provider and the pharmacist stated that would be just fine.

## 2025-07-23 DIAGNOSIS — F90.0 ATTENTION DEFICIT HYPERACTIVITY DISORDER (ADHD), PREDOMINANTLY INATTENTIVE TYPE: ICD-10-CM

## 2025-07-23 DIAGNOSIS — F41.1 GAD (GENERALIZED ANXIETY DISORDER): Chronic | ICD-10-CM

## 2025-07-23 RX ORDER — CLONAZEPAM 1 MG/1
1 TABLET ORAL 3 TIMES DAILY PRN
Qty: 90 TABLET | Refills: 0 | OUTPATIENT
Start: 2025-07-23

## 2025-07-23 RX ORDER — DEXTROAMPHETAMINE SACCHARATE, AMPHETAMINE ASPARTATE, DEXTROAMPHETAMINE SULFATE AND AMPHETAMINE SULFATE 5; 5; 5; 5 MG/1; MG/1; MG/1; MG/1
20 TABLET ORAL 3 TIMES DAILY
Qty: 90 TABLET | Refills: 0 | Status: SHIPPED | OUTPATIENT
Start: 2025-07-23 | End: 2026-07-23

## 2025-07-23 RX ORDER — CLONAZEPAM 1 MG/1
1 TABLET ORAL 3 TIMES DAILY PRN
Qty: 90 TABLET | Refills: 0 | Status: SHIPPED | OUTPATIENT
Start: 2025-07-23

## 2025-07-23 RX ORDER — PROCHLORPERAZINE MALEATE 5 MG/1
5 TABLET ORAL EVERY 8 HOURS PRN
Qty: 90 TABLET | Refills: 1 | Status: SHIPPED | OUTPATIENT
Start: 2025-07-23

## 2025-08-25 DIAGNOSIS — F90.0 ATTENTION DEFICIT HYPERACTIVITY DISORDER (ADHD), PREDOMINANTLY INATTENTIVE TYPE: ICD-10-CM

## 2025-08-25 DIAGNOSIS — F41.1 GAD (GENERALIZED ANXIETY DISORDER): Chronic | ICD-10-CM

## 2025-08-26 RX ORDER — PROCHLORPERAZINE MALEATE 5 MG/1
5 TABLET ORAL EVERY 8 HOURS PRN
Qty: 90 TABLET | Refills: 1 | OUTPATIENT
Start: 2025-08-26

## 2025-08-26 RX ORDER — CLONAZEPAM 1 MG/1
1 TABLET ORAL 3 TIMES DAILY PRN
Qty: 90 TABLET | Refills: 0 | Status: SHIPPED | OUTPATIENT
Start: 2025-08-26

## 2025-08-26 RX ORDER — DEXTROAMPHETAMINE SACCHARATE, AMPHETAMINE ASPARTATE, DEXTROAMPHETAMINE SULFATE AND AMPHETAMINE SULFATE 5; 5; 5; 5 MG/1; MG/1; MG/1; MG/1
20 TABLET ORAL 3 TIMES DAILY
Qty: 90 TABLET | Refills: 0 | Status: SHIPPED | OUTPATIENT
Start: 2025-08-26 | End: 2026-08-26